# Patient Record
Sex: FEMALE | Race: OTHER | Employment: FULL TIME | URBAN - METROPOLITAN AREA
[De-identification: names, ages, dates, MRNs, and addresses within clinical notes are randomized per-mention and may not be internally consistent; named-entity substitution may affect disease eponyms.]

---

## 2018-04-11 ENCOUNTER — HOSPITAL ENCOUNTER (EMERGENCY)
Age: 48
Discharge: HOME | End: 2018-04-12
Attending: EMERGENCY MEDICINE
Payer: SELF-PAY

## 2018-04-11 ENCOUNTER — APPOINTMENT (OUTPATIENT)
Dept: CT IMAGING | Age: 48
End: 2018-04-11
Payer: SELF-PAY

## 2018-04-11 DIAGNOSIS — G44.209 ACUTE NON INTRACTABLE TENSION-TYPE HEADACHE: Primary | ICD-10-CM

## 2018-04-11 DIAGNOSIS — G47.00 INSOMNIA, UNSPECIFIED TYPE: ICD-10-CM

## 2018-04-11 PROCEDURE — 70450 CT HEAD/BRAIN W/O DYE: CPT

## 2018-04-11 PROCEDURE — 99282 EMERGENCY DEPT VISIT SF MDM: CPT | Performed by: EMERGENCY MEDICINE

## 2018-04-11 RX ORDER — MULTIVITAMIN WITH IRON
1 TABLET ORAL DAILY
COMMUNITY
End: 2022-04-05

## 2018-04-11 NOTE — LETTER
[facility] EMERGENCY DEPT 
[address] [place] [id number] [phone] Work/School Note Date: 4/11/2018 To Whom It May concern: 
 
Eleazar Amaro was seen and treated today in the emergency room by the following provider(s): 
Attending Provider: Danis Garces MD. Eleazar Amaro may return to work on 11/13/2018. Sincerely, Brent Davenport RN

## 2018-04-12 VITALS
DIASTOLIC BLOOD PRESSURE: 90 MMHG | WEIGHT: 145.44 LBS | SYSTOLIC BLOOD PRESSURE: 155 MMHG | TEMPERATURE: 98 F | HEART RATE: 75 BPM | BODY MASS INDEX: 28.55 KG/M2 | RESPIRATION RATE: 16 BRPM | HEIGHT: 60 IN | OXYGEN SATURATION: 100 %

## 2018-04-12 PROCEDURE — 74011250636 HC RX REV CODE- 250/636: Performed by: EMERGENCY MEDICINE

## 2018-04-12 PROCEDURE — 96375 TX/PRO/DX INJ NEW DRUG ADDON: CPT | Performed by: EMERGENCY MEDICINE

## 2018-04-12 PROCEDURE — 96361 HYDRATE IV INFUSION ADD-ON: CPT | Performed by: EMERGENCY MEDICINE

## 2018-04-12 PROCEDURE — 96374 THER/PROPH/DIAG INJ IV PUSH: CPT | Performed by: EMERGENCY MEDICINE

## 2018-04-12 RX ORDER — METOCLOPRAMIDE HYDROCHLORIDE 5 MG/ML
10 INJECTION INTRAMUSCULAR; INTRAVENOUS
Status: COMPLETED | OUTPATIENT
Start: 2018-04-12 | End: 2018-04-12

## 2018-04-12 RX ORDER — KETOROLAC TROMETHAMINE 30 MG/ML
30 INJECTION, SOLUTION INTRAMUSCULAR; INTRAVENOUS
Status: DISCONTINUED | OUTPATIENT
Start: 2018-04-12 | End: 2018-04-12

## 2018-04-12 RX ORDER — DIPHENHYDRAMINE HYDROCHLORIDE 50 MG/ML
12.5 INJECTION, SOLUTION INTRAMUSCULAR; INTRAVENOUS
Status: COMPLETED | OUTPATIENT
Start: 2018-04-12 | End: 2018-04-12

## 2018-04-12 RX ORDER — ZOLPIDEM TARTRATE 5 MG/1
5 TABLET ORAL
Qty: 12 TAB | Refills: 0 | Status: SHIPPED | OUTPATIENT
Start: 2018-04-12 | End: 2022-02-17

## 2018-04-12 RX ADMIN — SODIUM CHLORIDE 1000 ML: 900 INJECTION, SOLUTION INTRAVENOUS at 00:59

## 2018-04-12 RX ADMIN — METOCLOPRAMIDE 10 MG: 5 INJECTION, SOLUTION INTRAMUSCULAR; INTRAVENOUS at 00:55

## 2018-04-12 RX ADMIN — DIPHENHYDRAMINE HYDROCHLORIDE 12.5 MG: 50 INJECTION, SOLUTION INTRAMUSCULAR; INTRAVENOUS at 00:55

## 2018-04-12 NOTE — ED NOTES
I have reviewed discharge instructions with the patient. The patient verbalized understanding. Patient left ED via Discharge Method: ambulatory to Home with ( family, self). Opportunity for questions and clarification provided. Patient given 1 scripts. To continue your aftercare when you leave the hospital, you may receive an automated call from our care team to check in on how you are doing. This is a free service and part of our promise to provide the best care and service to meet your aftercare needs.  If you have questions, or wish to unsubscribe from this service please call [phone]. Thank you for Choosing our [employer] [facility] Emergency Department.

## 2018-04-12 NOTE — ED PROVIDER NOTES
HPI Comments: Per the family, the patient has chronic problems with sleep, often only getting 1-2 hours per night; lots of things on her mind. Patient is a 56 y.o. female presenting with headaches. The history is provided by the patient and a relative. The history is limited by a language barrier. A  was used. Headache    This is a new problem. The current episode started more than 1 week ago. The problem occurs constantly. The problem has been gradually worsening. The headache is aggravated by nothing. The pain is located in the generalized region. The quality of the pain is described as throbbing and dull. The pain is at a severity of 8/10. Associated symptoms include malaise/fatigue. Pertinent negatives include no anorexia, no fever, no chest pressure, no near-syncope, no orthopnea, no palpitations, no syncope, no shortness of breath, no weakness, no tingling, no dizziness, no visual change, no nausea and no vomiting. She has tried nothing for the symptoms. The treatment provided no relief. History reviewed. No pertinent past medical history. History reviewed. No pertinent surgical history. History reviewed. No pertinent family history. Social History     Social History    Marital status:      Spouse name: N/A    Number of children: N/A    Years of education: N/A     Occupational History    Not on file. Social History Main Topics    Smoking status: Never Smoker    Smokeless tobacco: Never Used    Alcohol use No    Drug use: No    Sexual activity: Not Currently     Other Topics Concern    Not on file     Social History Narrative    No narrative on file         ALLERGIES: Review of patient's allergies indicates no known allergies. Review of Systems   Constitutional: Positive for fatigue and malaise/fatigue. Negative for appetite change and fever. Respiratory: Negative for shortness of breath.     Cardiovascular: Negative for palpitations, orthopnea, syncope and near-syncope. Gastrointestinal: Negative for anorexia, nausea and vomiting. Neurological: Positive for headaches. Negative for dizziness, tingling, seizures, weakness, light-headedness and numbness. Psychiatric/Behavioral: Positive for decreased concentration and sleep disturbance. The patient is nervous/anxious. All other systems reviewed and are negative. Vitals:    04/11/18 2211   BP: (!) 180/95   Pulse: 78   Resp: 16   Temp: 98 °F (36.7 °C)   SpO2: 100%   Weight: 66 kg (145 lb 7 oz)   Height: 5' (1.524 m)            Physical Exam   Constitutional: She is oriented to person, place, and time. She appears well-developed and well-nourished. No distress. HENT:   Head: Normocephalic and atraumatic. Right Ear: Tympanic membrane and external ear normal.   Left Ear: Tympanic membrane and external ear normal.   Mouth/Throat: Oropharynx is clear and moist.   Eyes: Conjunctivae and EOM are normal. Pupils are equal, round, and reactive to light. Neck: Normal range of motion. Neck supple. No tracheal deviation present. Cardiovascular: Normal rate, regular rhythm, normal heart sounds and intact distal pulses. Exam reveals no gallop and no friction rub. No murmur heard. Pulmonary/Chest: Effort normal and breath sounds normal. No respiratory distress. She has no wheezes. Abdominal: Soft. Bowel sounds are normal. She exhibits no distension and no mass. There is no hepatosplenomegaly. There is no tenderness. There is no rebound and no guarding. Musculoskeletal: Normal range of motion. She exhibits no edema. Lymphadenopathy:     She has no cervical adenopathy. Neurological: She is alert and oriented to person, place, and time. She has normal strength. She displays normal reflexes. No cranial nerve deficit or sensory deficit. Coordination normal.   Skin: Skin is warm and dry. No rash noted. She is not diaphoretic. No erythema. Psychiatric: She has a normal mood and affect. Her speech is normal and behavior is normal. Judgment and thought content normal. Cognition and memory are normal.   Nursing note and vitals reviewed.        MDM  Number of Diagnoses or Management Options  Acute non intractable tension-type headache: new and requires workup  Insomnia, unspecified type: new and requires workup     Amount and/or Complexity of Data Reviewed  Tests in the radiology section of CPT®: ordered and reviewed  Review and summarize past medical records: yes    Risk of Complications, Morbidity, and/or Mortality  Presenting problems: moderate  Diagnostic procedures: moderate  Management options: moderate    Patient Progress  Patient progress: improved        ED Course       Procedures

## 2018-04-12 NOTE — DISCHARGE INSTRUCTIONS
Dolor de roly: Instrucciones de cuidado - [ Headache: Care Instructions ]  Instrucciones de cuidado    Los edson de roly tienen muchas causas posibles. La mayoría de los edson de roly no son señal de un problema más venice y mejoran por sí solos. El tratamiento en el hogar podría ayudarlo a sentirse mejor con [facility]. El médico lo singh revisado minuciosamente, roosevelt puede desarrollar problemas más tarde. Si nota algún problema o síntomas, busque tratamiento médico inmediatamente. La atención de seguimiento es melecio parte clave de brooks tratamiento y seguridad. Asegúrese de hacer y acudir a todas las citas, y llame a brooks médico si está teniendo problemas. También es melecio buena idea saber los resultados de los exámenes y mantener melecio lista de los medicamentos que angelita. ¿Cómo puede cuidarse en el hogar? · No conduzca si ha tomado analgésicos (medicamentos para el dolor) recetados. · Descanse en un cuarto tranquilo y oscuro hasta que desaparezca el dolor de Adam Mary Kate. Cierre los ojos y trate de relajarse o dormirse. No torie la televisión ni [place]. · Colóquese un paño frío y húmedo o [facility] compresa fría sobre la eneida adolorida de 10 a 21 minutos cada vez. Póngase un paño dhaliwal entre la compresa fría y la piel. · Utilice melecio toalla húmeda tibia o melecio almohadilla térmica ajustada a baja temperatura para relajar los músculos tensos del guilherme y los hombros.  · Pídale a alguien que le tess masajes suaves en el guilherme y los hombros.  · [place] los analgésicos exactamente mee le fueron indicados. ¨ Si el médico le recetó un analgésico, tómelo según las indicaciones. ¨ Si no está tomando un analgésico recetado, pregúntele a brokos médico si puede sarahy andrea de [employer]. · Tenga cuidado de no sarahy analgésicos con mayor frecuencia que la permitida en las indicaciones porque los edson de roly podrían empeorar o aparecer con mayor frecuencia melecio vez que el medicamento pierda brooks [place].   · Preste atención a los nuevos síntomas que aparecen con el dolor de [place], [place], debilidad o entumecimiento, cambios en la visión o confusión. Podrían ser señales de un problema más grave. Para prevenir los edson de roly  · [employer] un diario de juan carlos edson de roly para que pueda averiguar qué los desencadena. Evitar los desencadenantes podría ayudar a prevenir los edson de [place]. Anote cuándo empieza cada dolor de [place], cuánto dura y cómo es el dolor (palpitante, channing, punzante o sordo). Anote cualquier otro síntoma que haya tenido con el dolor de [place], [place] náuseas, destellos de cory o ARNIE, o sensibilidad a la cory brillante o a los ruidos jeri. Anote si el dolor de roly ocurrió cerca de brooks menstruación. Enumere todos los factores que pudieran nikki desencadenado el dolor de [place], mee ciertos alimentos (chocolate, queso, vino) u olores, humo, luces brillantes, estrés o falta de sueño. · Encuentre maneras saludables de [facility]. Los edson de [place] son más comunes delbert o paola después de un momento estresante. Tómese un tiempo para relajarse antes y después de hacer algo que le haya causado un dolor de roly en el pasado. · Trate de mantener juan carlos músculos relajados mediante melecio buena postura. Revise si tiene [employer] de la Annamarie, la carrie, el guilherme y los hombros y trate de relajarlos. Cuando se siente en un escritorio, cambie de posición con frecuencia y estírese por 27 segundos cada hora. · Danilo suficiente ejercicio y duerma bastante. · Coma en forma regular y jaycee. Largos períodos sin comer pueden provocar un dolor de roly. · Regálese un masaje. Algunas personas encuentran que los masajes hechos con regularidad son Elvi Muse para aliviar la tensión. · Limite la cafeína. No brandan demasiado café, té ni sodas. Marissa no deje de consumir cafeína de repente, porque eso también puede provocarle edson de [place].   · Reduzca la tensión en los ojos a causa de la computadora parpadeando con frecuencia y apartando la mirada de la pantalla a menudo. Asegúrese de tener lentes adecuados y de que brooks monitor esté colocado de manera correcta, mee a un brazo de distancia. · Busque ayuda si tiene depresión o ansiedad. Halima edson de [place] podrían relacionarse con estas afecciones. El tratamiento puede prevenir los edson de [place] y ayudar con los síntomas de ansiedad o depresión. ¿Cuándo debe pedir ayuda? Llame al 911 en cualquier momento que piense que puede necesitar atención de emergencia. Por ejemplo, llame si:  ? · Tiene señales de un ataque cerebral. Estas pueden incluir:  ¨ Parálisis, entumecimiento o debilidad repentinos en la carrie, el brazo o la pierna, sobre todo si ocurre en un solo lado del cuerpo. ¨ Cambios repentinos en la visión. ¨ Dificultades repentinas para hablar. ¨ Confusión repentina o dificultad para comprender frases sencillas. ¨ Problemas repentinos para caminar o mantener el equilibrio. ¨ Dolor de [place] intenso y repentino, distinto de los edson de roly anteriores. ?Llame a brooks médico ahora mismo o busque atención médica inmediata si:  ? · Tiene un dolor de roly nuevo o peor. ? · Brooks dolor de roly Cubresa. ¿Dónde puede encontrar más información en inglés? Vicky Shay a [email or web address]-anthony.info/. Escriba F023 en la búsqueda para aprender más acerca de \"Dolor de roly: Instrucciones de cuidado - [ Headache: Care Instructions ]. \"  Revisado: 14 octubre, 2016  Versión del contenido: 11.4  © 0175-2713 Healthwise, Incorporated. Las instrucciones de cuidado fueron adaptadas bajo licencia por [employer] (which disclaims liability or warranty for this information). Si usted tiene [place] afección médica o sobre estas instrucciones, siempre pregunte a brooks profesional de mary. Healthwise, Incorporated niega toda garantía o responsabilidad por brooks uso de esta información. Insomnio:  Instrucciones de cuidado - [ Insomnia: Care Instructions ]  Instrucciones de cuidado    El insomnio es la incapacidad para dormir jaycee. Es un problema común para la mayoría de las personas en algún momento. El insomnio puede hacer que resulte difícil dormirse, permanecer dormido o dormir el tiempo necesario. [place] puede provocarle fatiga y mal humor delbert el día. También puede hacer que esté olvidadizo e infeliz y que sea menos eficiente en [place]. Algunos trastornos, [employer] depresión o la ansiedad, pueden provocar insomnio. El dolor también puede afectar brooks capacidad para dormir. Cuando se resuelven [place] [facility], el insomnio generalmente desaparece. A veces, los malos hábitos de sueño pueden provocar insomnio. Si el insomnio le afecta en brooks trabajo o brooks capacidad para disfrutar la erasto, puede sarahy medidas para mejorar el sueño. La atención de seguimiento es melecio parte clave de brooks tratamiento y seguridad. Asegúrese de hacer y acudir a todas las citas, y llame a brooks médico si está teniendo problemas. También es melecio buena idea saber los resultados de los exámenes y mantener melecio lista de los medicamentos que angelita. ¿Cómo puede cuidarse en el hogar? Qué evitar  · No tome bebidas con cafeína, mee café o té dayana, delbert las 8 horas antes de [place]. · No fume ni use otros tipos de tabaco cerca de la hora de acostarse. La nicotina es un estimulante y puede mantenerlo despierto. · Evite beber alcohol por la noche, porque puede hacer que se despierte en la mitad de la noche. · No coma melecio comida abundante cerca de la hora de [place]. Si tiene hambre, coma algo liviano. · No tome mucha agua cerca de la hora de [place], porque la necesidad de orinar puede despertarlo delbert la noche. · No [place] ni torie televisión en la cama. Use la cama solo para dormir y para tener relaciones sexuales. Qué intentar  · Delfin Late a dormir a la misma hora todas las noches y levántese a la misma hora cada mañana.  No duerma la siesta delbert el día. · Mantenga brooks dormitorio silencioso, oscuro y fresco.  · Duerma con Rozena Scales y un colchón cómodos. · Si mirar el reloj le causa ansiedad, gírelo de [place] que no pueda tomas la hora. · Si tiene preocupaciones cuando se acuesta, lleve un diario de halima preocupaciones. Bastante antes de la hora de [place], escriba las preocupaciones y luego deje de lado el diario y halima inquietudes. · Pruebe la meditación u otras técnicas de relajación antes de [place]. · Si no puede dormir, levántese y Drusilla Allie a otra habitación hasta que sienta sueño. Danilo algo que lo relaje. Repita la rutina de acostarse antes de volver a la cama. · Danilo que brooks hogar esté en silencio y en calma aproximadamente melecio hora antes de acostarse. Baje la intensidad de las luces, apague el televisor y la computadora, y baje el volumen de la [place]. [place] puede ayudarle a relajarse después de un día ajetreado. ¿Cuándo debe pedir ayuda? Preste especial atención a los cambios en brooks mary y asegúrese de comunicarse con brooks médico si:  ? · Halima esfuerzos por mejorar el sueño no dan resultado. ? · Brooks insomnio empeora. ? · Se ha estado sintiendo decaído, deprimido o desesperanzado, o ha perdido el interés por cosas que disfrutaba hacer. ¿Dónde puede encontrar más información en inglés? Nany Jensen a http://joseph-anthony.info/. Escriba P513 en la búsqueda para aprender más acerca de \"Insomnio: Instrucciones de cuidado - [ Insomnia: Care Instructions ]. \"  Revisado: 26 julio, 2016  Versión del contenido: 11.4  © 5455-9366 Healthwise, Incorporated. Las instrucciones de cuidado fueron adaptadas bajo licencia por [employer] (which disclaims liability or warranty for this information). Si usted tiene [place] afección médica o sobre estas instrucciones, siempre pregunte a brooks profesional de mary. [employer] niega toda garantía o responsabilidad por brooks uso de esta información.

## 2018-04-12 NOTE — ED TRIAGE NOTES
States that she has had a headache for 1 week.   decreased sleep patterns and is having difficulty with her memory

## 2019-11-26 PROCEDURE — 99283 EMERGENCY DEPT VISIT LOW MDM: CPT

## 2019-11-27 ENCOUNTER — APPOINTMENT (OUTPATIENT)
Dept: CT IMAGING | Age: 49
End: 2019-11-27
Payer: SELF-PAY

## 2019-11-27 ENCOUNTER — HOSPITAL ENCOUNTER (EMERGENCY)
Age: 49
Discharge: HOME | End: 2019-11-27
Payer: SELF-PAY

## 2019-11-27 VITALS
BODY MASS INDEX: 28.47 KG/M2 | WEIGHT: 145 LBS | DIASTOLIC BLOOD PRESSURE: 82 MMHG | OXYGEN SATURATION: 98 % | HEART RATE: 70 BPM | RESPIRATION RATE: 16 BRPM | HEIGHT: 60 IN | TEMPERATURE: 97.6 F | SYSTOLIC BLOOD PRESSURE: 137 MMHG

## 2019-11-27 DIAGNOSIS — R51.9 LEFT FACIAL PAIN: Primary | ICD-10-CM

## 2019-11-27 LAB
ANION GAP SERPL CALC-SCNC: 2 MMOL/L (ref 7–16)
BASOPHILS # BLD: 0 K/UL (ref 0–0.2)
BASOPHILS NFR BLD: 0 % (ref 0–2)
BUN SERPL-MCNC: 14 MG/DL (ref 6–23)
CALCIUM SERPL-MCNC: 9.6 MG/DL (ref 8.3–10.4)
CHLORIDE SERPL-SCNC: 104 MMOL/L (ref 98–107)
CO2 SERPL-SCNC: 28 MMOL/L (ref 21–32)
CREAT SERPL-MCNC: 0.58 MG/DL (ref 0.6–1)
CRP SERPL-MCNC: <0.3 MG/DL (ref 0–0.9)
DIFFERENTIAL METHOD BLD: ABNORMAL
EOSINOPHIL # BLD: 0 K/UL (ref 0–0.8)
EOSINOPHIL NFR BLD: 0 % (ref 0.5–7.8)
ERYTHROCYTE [DISTWIDTH] IN BLOOD BY AUTOMATED COUNT: 14.4 % (ref 11.9–14.6)
ERYTHROCYTE [SEDIMENTATION RATE] IN BLOOD: 6 MM/HR (ref 0–20)
GLUCOSE SERPL-MCNC: 122 MG/DL (ref 65–100)
HCT VFR BLD AUTO: 42.7 % (ref 35.8–46.3)
HGB BLD-MCNC: 13.7 G/DL (ref 11.7–15.4)
IMM GRANULOCYTES # BLD AUTO: 0 K/UL (ref 0–0.5)
IMM GRANULOCYTES NFR BLD AUTO: 0 % (ref 0–5)
LYMPHOCYTES # BLD: 2.2 K/UL (ref 0.5–4.6)
LYMPHOCYTES NFR BLD: 22 % (ref 13–44)
MCH RBC QN AUTO: 27.2 PG (ref 26.1–32.9)
MCHC RBC AUTO-ENTMCNC: 32.1 G/DL (ref 31.4–35)
MCV RBC AUTO: 84.7 FL (ref 79.6–97.8)
MONOCYTES # BLD: 0.6 K/UL (ref 0.1–1.3)
MONOCYTES NFR BLD: 6 % (ref 4–12)
NEUTS SEG # BLD: 7.5 K/UL (ref 1.7–8.2)
NEUTS SEG NFR BLD: 72 % (ref 43–78)
NRBC # BLD: 0 K/UL (ref 0–0.2)
PLATELET # BLD AUTO: 244 K/UL (ref 150–450)
PMV BLD AUTO: 10.7 FL (ref 9.4–12.3)
POTASSIUM SERPL-SCNC: 4.1 MMOL/L (ref 3.5–5.1)
RBC # BLD AUTO: 5.04 M/UL (ref 4.05–5.2)
SODIUM SERPL-SCNC: 134 MMOL/L (ref 136–145)
WBC # BLD AUTO: 10.4 K/UL (ref 4.3–11.1)

## 2019-11-27 PROCEDURE — 85025 COMPLETE CBC W/AUTO DIFF WBC: CPT

## 2019-11-27 PROCEDURE — 70450 CT HEAD/BRAIN W/O DYE: CPT

## 2019-11-27 PROCEDURE — 85652 RBC SED RATE AUTOMATED: CPT

## 2019-11-27 PROCEDURE — 80048 BASIC METABOLIC PNL TOTAL CA: CPT

## 2019-11-27 PROCEDURE — 86140 C-REACTIVE PROTEIN: CPT

## 2019-11-27 RX ORDER — PREDNISONE 10 MG/1
TABLET ORAL
Qty: 21 TAB | Refills: 0 | Status: SHIPPED | OUTPATIENT
Start: 2019-11-27 | End: 2022-02-17

## 2019-11-27 NOTE — ED PROVIDER NOTES
72-year-old female complaining of left facial pain and swelling. Patient was seen by her primary care physician and diagnosed with shingles. Is taking acyclovir and prednisone at this time. Continues to have pain and swelling to the left side of her face she also has a headache. Patient was complaining of some blurred vision as well. Headache    This is a new problem. The current episode started more than 1 week ago. The problem occurs constantly. The problem has not changed since onset. The headache is aggravated by bright light. The pain is located in the left unilateral region. Past Medical History:   Diagnosis Date    Cancer [facility])     thyroid CA        No past surgical history on file. No family history on file.     Social History     Socioeconomic History    Marital status:      Spouse name: Not on file    Number of children: Not on file    Years of education: Not on file    Highest education level: Not on file   Occupational History    Not on file   Social Needs    Financial resource strain: Not on file    Food insecurity:     Worry: Not on file     Inability: Not on file    Transportation needs:     Medical: Not on file     Non-medical: Not on file   Tobacco Use    Smoking status: Never Smoker    Smokeless tobacco: Never Used   Substance and Sexual Activity    Alcohol use: No    Drug use: No    Sexual activity: Not Currently   Lifestyle    Physical activity:     Days per week: Not on file     Minutes per session: Not on file    Stress: Not on file   Relationships    Social connections:     Talks on phone: Not on file     Gets together: Not on file     Attends Pentecostal service: Not on file     Active member of club or organization: Not on file     Attends meetings of clubs or organizations: Not on file     Relationship status: Not on file    Intimate partner violence:     Fear of current or ex partner: Not on file     Emotionally abused: Not on file     Physically abused: Not on file     Forced sexual activity: Not on file   Other Topics Concern    Not on file   Social History Narrative    Not on file         ALLERGIES: Patient has no known allergies. Review of Systems   Constitutional: Negative. Negative for activity change. HENT: Negative. Eyes: Negative. Respiratory: Negative. Cardiovascular: Negative. Gastrointestinal: Negative. Genitourinary: Negative. Musculoskeletal: Negative. Skin: Negative. Neurological: Positive for headaches. Psychiatric/Behavioral: Negative. All other systems reviewed and are negative. Vitals:    11/26/19 2303   BP: 153/89   Pulse: 68   Resp: 18   Temp: 97.6 °F (36.4 °C)   SpO2: 98%   Weight: 65.8 kg (145 lb)   Height: 5' (1.524 m)            Physical Exam  Vitals signs and nursing note reviewed. Constitutional:       General: She is not in acute distress. Appearance: She is well-developed. She is not diaphoretic. HENT:      Head: Normocephalic and atraumatic. Right Ear: Ear canal and external ear normal. No hemotympanum. Tympanic membrane is not injected, scarred, perforated, erythematous, retracted or bulging. Left Ear: Ear canal and external ear normal. No hemotympanum. Tympanic membrane is not injected, scarred, perforated, erythematous, retracted or bulging. Nose: Nose normal.      Mouth/Throat:      Pharynx: No oropharyngeal exudate. Eyes:      General: No scleral icterus. Right eye: No discharge. Left eye: No discharge. Conjunctiva/sclera: Conjunctivae normal.      Pupils: Pupils are equal, round, and reactive to light. Neck:      Musculoskeletal: Normal range of motion and neck supple. Vascular: No JVD. Trachea: No tracheal deviation. Cardiovascular:      Rate and Rhythm: Normal rate and regular rhythm. Pulmonary:      Effort: Pulmonary effort is normal. No respiratory distress. Breath sounds: Normal breath sounds. No stridor.  No wheezing. Chest:      Chest wall: No tenderness. Abdominal:      General: Bowel sounds are normal. There is no distension. Palpations: Abdomen is soft. There is no mass. Tenderness: There is no tenderness. Musculoskeletal: Normal range of motion. General: No tenderness. Skin:     General: Skin is warm and dry. Coloration: Skin is not pale. Findings: No erythema or rash. Neurological:      Mental Status: She is alert and oriented to person, place, and time. Cranial Nerves: No cranial nerve deficit. Psychiatric:         Behavior: Behavior normal.         Thought Content: Thought content normal.          MDM  Number of Diagnoses or Management Options  Left facial pain: minor  Diagnosis management comments: No vesicular dendritic rash cannot see signs of shingles. Patient has normal inflammatory markers which would like to decrease the likelihood of giant cell temporal arteritis.        Amount and/or Complexity of Data Reviewed  Clinical lab tests: ordered and reviewed  Tests in the radiology section of CPT®: ordered and reviewed  Tests in the medicine section of CPT®: reviewed and ordered           Procedures

## 2019-11-27 NOTE — ED NOTES
Patient to ED via POV. Patient with complaint of pain of L sided facial pain x \"a few weeks. \" Seen by PMD, started on prednisone, acyclovir, trazodone. Patient reports worsening of symptoms with report of worsening L eye pain, photophobia. Patient with = , - facial droop, - arm sway. No s/s of acute CVA. Patient with hx thyroid CA, states in process of scheduling surgery.

## 2019-11-27 NOTE — ED NOTES
I have reviewed discharge instructions with the patient. The patient verbalized understanding. Patient left ED via Discharge Method: ambulatory to Home with her son. Opportunity for questions and clarification provided. Patient given 0 scripts. To continue your aftercare when you leave the hospital, you may receive an automated call from our care team to check in on how you are doing. This is a free service and part of our promise to provide the best care and service to meet your aftercare needs.  If you have questions, or wish to unsubscribe from this service please call [phone]. Thank you for Choosing our [facility] Emergency Department.

## 2019-11-27 NOTE — PROGRESS NOTES
available from 4:30 p.m. - 6:00 a.m. Please call Dougie at [phone] with any interpreting requests. Thank you,      Jess Garner 89  [email or web address] c: [phone] / [address] / [place], [address] [facility]  [email or web address]

## 2022-01-19 ENCOUNTER — HOSPITAL ENCOUNTER (EMERGENCY)
Age: 52
Discharge: HOME | End: 2022-01-20
Payer: COMMERCIAL

## 2022-01-19 ENCOUNTER — APPOINTMENT (OUTPATIENT)
Dept: CT IMAGING | Age: 52
End: 2022-01-19
Attending: PHYSICIAN ASSISTANT
Payer: COMMERCIAL

## 2022-01-19 VITALS
HEIGHT: 60 IN | WEIGHT: 158 LBS | SYSTOLIC BLOOD PRESSURE: 137 MMHG | BODY MASS INDEX: 31.02 KG/M2 | RESPIRATION RATE: 16 BRPM | DIASTOLIC BLOOD PRESSURE: 89 MMHG | HEART RATE: 72 BPM | OXYGEN SATURATION: 100 % | TEMPERATURE: 97.8 F

## 2022-01-19 DIAGNOSIS — N83.202 LEFT OVARIAN CYST: Primary | ICD-10-CM

## 2022-01-19 PROBLEM — N81.10 VAGINAL PROLAPSE: Status: ACTIVE | Noted: 2022-01-19

## 2022-01-19 PROBLEM — R10.84 GENERALIZED ABDOMINAL PAIN: Status: ACTIVE | Noted: 2022-01-19

## 2022-01-19 LAB
ALBUMIN SERPL-MCNC: 3.8 G/DL (ref 3.5–5)
ALBUMIN/GLOB SERPL: 0.9 {RATIO} (ref 1.2–3.5)
ALP SERPL-CCNC: 114 U/L (ref 50–136)
ALT SERPL-CCNC: 39 U/L (ref 12–65)
ANION GAP SERPL CALC-SCNC: 5 MMOL/L (ref 7–16)
AST SERPL-CCNC: 25 U/L (ref 15–37)
BASOPHILS # BLD: 0 K/UL (ref 0–0.2)
BASOPHILS NFR BLD: 0 % (ref 0–2)
BILIRUB SERPL-MCNC: 0.3 MG/DL (ref 0.2–1.1)
BUN SERPL-MCNC: 7 MG/DL (ref 6–23)
CALCIUM SERPL-MCNC: 9.5 MG/DL (ref 8.3–10.4)
CHLORIDE SERPL-SCNC: 105 MMOL/L (ref 98–107)
CO2 SERPL-SCNC: 29 MMOL/L (ref 21–32)
CREAT SERPL-MCNC: 0.58 MG/DL (ref 0.6–1)
DIFFERENTIAL METHOD BLD: NORMAL
EOSINOPHIL # BLD: 0.1 K/UL (ref 0–0.8)
EOSINOPHIL NFR BLD: 2 % (ref 0.5–7.8)
ERYTHROCYTE [DISTWIDTH] IN BLOOD BY AUTOMATED COUNT: 13.1 % (ref 11.9–14.6)
GLOBULIN SER CALC-MCNC: 4.3 G/DL (ref 2.3–3.5)
GLUCOSE SERPL-MCNC: 89 MG/DL (ref 65–100)
HCT VFR BLD AUTO: 43.3 % (ref 35.8–46.3)
HGB BLD-MCNC: 13.8 G/DL (ref 11.7–15.4)
IMM GRANULOCYTES # BLD AUTO: 0 K/UL (ref 0–0.5)
IMM GRANULOCYTES NFR BLD AUTO: 0 % (ref 0–5)
LYMPHOCYTES # BLD: 1.8 K/UL (ref 0.5–4.6)
LYMPHOCYTES NFR BLD: 28 % (ref 13–44)
MCH RBC QN AUTO: 27 PG (ref 26.1–32.9)
MCHC RBC AUTO-ENTMCNC: 31.9 G/DL (ref 31.4–35)
MCV RBC AUTO: 84.6 FL (ref 79.6–97.8)
MONOCYTES # BLD: 0.4 K/UL (ref 0.1–1.3)
MONOCYTES NFR BLD: 6 % (ref 4–12)
NEUTS SEG # BLD: 4 K/UL (ref 1.7–8.2)
NEUTS SEG NFR BLD: 64 % (ref 43–78)
NRBC # BLD: 0 K/UL (ref 0–0.2)
PLATELET # BLD AUTO: 235 K/UL (ref 150–450)
PMV BLD AUTO: 10.4 FL (ref 9.4–12.3)
POTASSIUM SERPL-SCNC: 3.7 MMOL/L (ref 3.5–5.1)
PROT SERPL-MCNC: 8.1 G/DL (ref 6.3–8.2)
RBC # BLD AUTO: 5.12 M/UL (ref 4.05–5.2)
SODIUM SERPL-SCNC: 139 MMOL/L (ref 136–145)
WBC # BLD AUTO: 6.3 K/UL (ref 4.3–11.1)

## 2022-01-19 PROCEDURE — 74177 CT ABD & PELVIS W/CONTRAST: CPT

## 2022-01-19 PROCEDURE — 96375 TX/PRO/DX INJ NEW DRUG ADDON: CPT

## 2022-01-19 PROCEDURE — 80053 COMPREHEN METABOLIC PANEL: CPT

## 2022-01-19 PROCEDURE — 96374 THER/PROPH/DIAG INJ IV PUSH: CPT

## 2022-01-19 PROCEDURE — 85025 COMPLETE CBC W/AUTO DIFF WBC: CPT

## 2022-01-19 PROCEDURE — 74011000636 HC RX REV CODE- 636

## 2022-01-19 PROCEDURE — 74011000258 HC RX REV CODE- 258

## 2022-01-19 PROCEDURE — 99283 EMERGENCY DEPT VISIT LOW MDM: CPT

## 2022-01-19 RX ORDER — SODIUM CHLORIDE 0.9 % (FLUSH) 0.9 %
10 SYRINGE (ML) INJECTION
Status: COMPLETED | OUTPATIENT
Start: 2022-01-19 | End: 2022-01-19

## 2022-01-19 RX ORDER — SODIUM CHLORIDE 0.9 % (FLUSH) 0.9 %
5-40 SYRINGE (ML) INJECTION AS NEEDED
Status: DISCONTINUED | OUTPATIENT
Start: 2022-01-19 | End: 2022-01-20 | Stop reason: HOSPADM

## 2022-01-19 RX ORDER — SODIUM CHLORIDE 0.9 % (FLUSH) 0.9 %
5-40 SYRINGE (ML) INJECTION EVERY 8 HOURS
Status: DISCONTINUED | OUTPATIENT
Start: 2022-01-19 | End: 2022-01-20 | Stop reason: HOSPADM

## 2022-01-19 RX ADMIN — Medication 10 ML: at 22:21

## 2022-01-19 RX ADMIN — IOPAMIDOL 100 ML: 755 INJECTION, SOLUTION INTRAVENOUS at 22:21

## 2022-01-19 RX ADMIN — SODIUM CHLORIDE 100 ML: 9 INJECTION, SOLUTION INTRAVENOUS at 22:21

## 2022-01-19 NOTE — ED NOTES
Pt c/o lower abd pain 4 days, no fever or vomiting possible ovarian cyst, no vaginal bleeding, no dysuria   Patient evaluated initially in triage. Rapid Medical Evaluation was conducted and necessary orders have been placed. I have performed a medical screening exam.  Care will now be transferred to the provider in the back of the emergency department.   NIKI Jaramillo 4:41 PM

## 2022-01-19 NOTE — ED TRIAGE NOTES
Patient with lower abdominal pain and pelvis pain, seen in office for vaginal prolapse. Patient was sent from outpatient procedure. Patient continues with severe pain and doctor wanted her seen in ED to rule out other possibilities.

## 2022-01-20 ENCOUNTER — APPOINTMENT (OUTPATIENT)
Dept: ULTRASOUND IMAGING | Age: 52
End: 2022-01-20
Payer: COMMERCIAL

## 2022-01-20 PROCEDURE — 74011250636 HC RX REV CODE- 250/636

## 2022-01-20 PROCEDURE — 93976 VASCULAR STUDY: CPT

## 2022-01-20 RX ORDER — NAPROXEN 500 MG/1
500 TABLET ORAL 2 TIMES DAILY WITH MEALS
Qty: 20 TABLET | Refills: 0 | Status: SHIPPED | OUTPATIENT
Start: 2022-01-20 | End: 2022-01-30

## 2022-01-20 RX ORDER — HYDROCODONE BITARTRATE AND ACETAMINOPHEN 7.5; 325 MG/1; MG/1
1 TABLET ORAL 2 TIMES DAILY
Qty: 8 TABLET | Refills: 0 | Status: SHIPPED | OUTPATIENT
Start: 2022-01-20 | End: 2022-02-19

## 2022-01-20 RX ORDER — ONDANSETRON 2 MG/ML
4 INJECTION INTRAMUSCULAR; INTRAVENOUS
Status: COMPLETED | OUTPATIENT
Start: 2022-01-20 | End: 2022-01-20

## 2022-01-20 RX ORDER — MORPHINE SULFATE 4 MG/ML
4 INJECTION INTRAVENOUS
Status: COMPLETED | OUTPATIENT
Start: 2022-01-20 | End: 2022-01-20

## 2022-01-20 RX ADMIN — MORPHINE SULFATE 4 MG: 4 INJECTION INTRAVENOUS at 03:04

## 2022-01-20 RX ADMIN — ONDANSETRON 4 MG: 2 INJECTION INTRAMUSCULAR; INTRAVENOUS at 03:04

## 2022-01-20 NOTE — ED PROVIDER NOTES
52-year-old female complaining of lower abdominal pain. Patient was seen at urologist office today for vaginal prolapse. However patient continued to complain of pain which is being on for 4 days. The urologist sent her to the ER for further evaluation of her abdominal pain. No fevers or chills no cough cold or flulike symptoms no diarrhea or constipation. The history is provided by the patient. Abdominal Pain   This is a new problem. The current episode started more than 2 days ago. The problem occurs constantly. The problem has not changed since onset. The pain is associated with an unknown factor. The pain is located in the suprapubic region. The quality of the pain is aching. The pain is at a severity of 8/10. The pain is moderate. Associated symptoms include dysuria. Pertinent negatives include no diarrhea, no nausea, no vomiting and no constipation. Nothing worsens the pain. The pain is relieved by nothing. Past Medical History:   Diagnosis Date    Cancer ([address]     thyroid CA        Past Surgical History:   Procedure Laterality Date    HX DILATION AND CURETTAGE      HX KNEE ARTHROSCOPY Left     HX ORTHOPAEDIC Left     Bone Transplant into wrist         No family history on file.     Social History     Socioeconomic History    Marital status:      Spouse name: Not on file    Number of children: Not on file    Years of education: Not on file    Highest education level: Not on file   Occupational History    Not on file   Tobacco Use    Smoking status: Never Smoker    Smokeless tobacco: Never Used   Substance and Sexual Activity    Alcohol use: No    Drug use: No    Sexual activity: Yes   Other Topics Concern    Not on file   Social History Narrative    Not on file     Social Determinants of Health     Financial Resource Strain:     Difficulty of Paying Living Expenses: Not on file   Food Insecurity:     Worried About Running Out of Food in the Last Year: Not on file    Ran Out of Food in the Last Year: Not on file   Transportation Needs:     Lack of Transportation (Medical): Not on file    Lack of Transportation (Non-Medical): Not on file   Physical Activity:     Days of Exercise per Week: Not on file    Minutes of Exercise per Session: Not on file   Stress:     Feeling of Stress : Not on file   Social Connections:     Frequency of Communication with Friends and Family: Not on file    Frequency of Social Gatherings with Friends and Family: Not on file    Attends Rastafari Services: Not on file    Active Member of [address] [place] [address] [employer] or Organizations: Not on file    Attends Club or Organization Meetings: Not on file    Marital Status: Not on file   Intimate Partner Violence:     Fear of Current or Ex-Partner: Not on file    Emotionally Abused: Not on file    Physically Abused: Not on file    Sexually Abused: Not on file   Housing Stability:     Unable to Pay for Housing in the Last Year: Not on file    Number of [place] in the Last Year: Not on file    Unstable Housing in the Last Year: Not on file         ALLERGIES: Patient has no known allergies. Review of Systems   Constitutional: Negative. Negative for activity change. HENT: Negative. Eyes: Negative. Respiratory: Negative. Cardiovascular: Negative. Gastrointestinal: Positive for abdominal pain. Negative for constipation, diarrhea, nausea and vomiting. Genitourinary: Positive for dysuria. Musculoskeletal: Negative. Skin: Negative. Neurological: Negative. Psychiatric/Behavioral: Negative. All other systems reviewed and are negative. Vitals:    01/19/22 1639 01/19/22 2037   BP: (!) 145/92 137/89   Pulse: 74 72   Resp: 16    Temp: 98 °F (36.7 °C) 97.8 °F (36.6 °C)   SpO2: 100% 100%   Weight: 71.7 kg (158 lb)    Height: 5' (1.524 m)             Physical Exam  Vitals and nursing note reviewed. Constitutional:       General: She is not in acute distress.      Appearance: She is well-developed. HENT:      Head: Normocephalic and atraumatic. Right Ear: External ear normal.      Left Ear: External ear normal.      Nose: Nose normal.   Eyes:      General: No scleral icterus. Right eye: No discharge. Left eye: No discharge. Conjunctiva/sclera: Conjunctivae normal.      Pupils: Pupils are equal, round, and reactive to light. Cardiovascular:      Rate and Rhythm: Regular rhythm. Pulmonary:      Effort: Pulmonary effort is normal. No respiratory distress. Breath sounds: Normal breath sounds. No stridor. No wheezing or rales. Abdominal:      General: Bowel sounds are normal. There is no distension. Palpations: Abdomen is soft. Tenderness: There is abdominal tenderness in the left lower quadrant. There is no guarding or rebound. Negative signs include Calvillo's sign, McBurney's sign and obturator sign. Musculoskeletal:         General: Normal range of motion. Cervical back: Normal range of motion. Skin:     General: Skin is warm and dry. Findings: No rash. Neurological:      Mental Status: She is alert and oriented to person, place, and time. Motor: No abnormal muscle tone. Coordination: Coordination normal.   Psychiatric:         Behavior: Behavior normal.          MDM  Number of Diagnoses or Management Options  Left ovarian cyst  Diagnosis management comments: Differential diagnosis: Biliary disease, appendicitis, pancreatitis, ovarian cyst, ovarian torsion, tubo-ovarian abscess, obstruction, ileus, aortic aneurysm, urolithiasis and renal colic. Abdominal pain has been greater than 4 days left lower quadrant spreading to the suprapubic area. Patient seen by urologist today has a diagnosis of prolapsed uterus. Will be scheduled for repair at some time in the future. Patient sent to rule out other causes of her pain. CT of the abdomen shows fibroid and left ovarian cyst 3 cm hemorrhagic.   Discussed with gynecology and will see her in the office pain control tonight.        Amount and/or Complexity of Data Reviewed  Clinical lab tests: ordered and reviewed  Tests in the radiology section of CPT®: ordered and reviewed  Tests in the medicine section of CPT®: ordered and reviewed  Decide to obtain previous medical records or to obtain history from someone other than the patient: yes  Review and summarize past medical records: yes  Discuss the patient with other providers: yes  Independent visualization of images, tracings, or specimens: yes    Risk of Complications, Morbidity, and/or Mortality  Presenting problems: high  Diagnostic procedures: high  Management options: high           Procedures

## 2022-01-20 NOTE — ED NOTES
I have reviewed discharge instructions with the patient. The patient verbalized understanding. Patient left ED via Discharge Method: ambulatory to Home with family member. Opportunity for questions and clarification provided. Patient given 2 scripts. To continue your aftercare when you leave the hospital, you may receive an automated call from our care team to check in on how you are doing. This is a free service and part of our promise to provide the best care and service to meet your aftercare needs.  If you have questions, or wish to unsubscribe from this service please call [phone]. Thank you for Choosing our [facility] Emergency Department.

## 2022-02-11 PROBLEM — N94.19 DYSPAREUNIA DUE TO MEDICAL CONDITION IN FEMALE: Status: ACTIVE | Noted: 2022-02-11

## 2022-02-11 PROBLEM — N83.209 CYST OF OVARY: Status: ACTIVE | Noted: 2022-02-11

## 2022-02-11 PROBLEM — R32 URINARY INCONTINENCE: Status: ACTIVE | Noted: 2022-02-11

## 2022-02-11 PROBLEM — D21.9 FIBROID: Status: ACTIVE | Noted: 2022-02-11

## 2022-02-11 PROBLEM — N85.2 ENLARGED UTERUS: Status: ACTIVE | Noted: 2022-02-11

## 2022-02-15 ENCOUNTER — HOSPITAL ENCOUNTER (OUTPATIENT)
Dept: SURGERY | Age: 52
Discharge: HOME | End: 2022-02-15

## 2022-02-17 VITALS — BODY MASS INDEX: 26.49 KG/M2 | WEIGHT: 159 LBS | HEIGHT: 65 IN

## 2022-02-17 NOTE — PERIOP NOTES
Patient verified name and  using T-VIPS  [id number]. Order for consent not found in EHR. Type 2 surgery, PAT phone assessment complete. Orders not received. Labs per surgeon: no orders received. Labs per anesthesia protocol: Hgb- Pt instructed to come for lab work (Monday- Friday 8:00 AM- 3:30 PM) prior to surgery day. Pt voiced an understanding. Patient COVID test date 22; Patient did show for the appointment. The testing center is located at the [facility] [address]  [place]. If appointment is needed-patient provided telephone number of [phone]. Patient answered medical/surgical history questions at their best of ability. All prior to admission medications documented in [facility]. Patient instructed to take the following medications the day of surgery according to anesthesia guidelines with a small sip of water: gabapentin and Lortab if needed. Hold all vitamins 7 days prior to surgery and NSAIDS 5 days prior to surgery. Prescription meds to hold: Ibuprofen. Patient instructed on the following:    > Arrive at A Entrance, time of arrival to be called the day before by 1700  > Nothing to eat after Midnight but can have clear liquids up until 2 hours prior to arrival time  > Responsible adult must drive patient to the hospital, stay during surgery, and patient will need supervision 24 hours after anesthesia  > Use Hibiclens  in shower the night before surgery and on the morning of surgery  > All piercings must be removed prior to arrival.    > Leave all valuables (money and jewelry) at home but bring insurance card and ID on DOS.   > You may be required to pay a deductible or co-pay on the day of your procedure. You can pre-pay by calling [phone] if your surgery is at the [facility] or [phone] if your surgery is at the [facility].   > Do not wear make-up, nail polish, lotions, cologne, perfumes, powders, or oil on skin. Artificial nails are not permitted. o9    Prydeinig surgical instructions, Hibiclens wash with Chinese instructions and ERAS pre-surgery drinks with Chinese instructions left for pt to  on 2/18/22.

## 2022-02-18 ENCOUNTER — HOSPITAL ENCOUNTER (OUTPATIENT)
Dept: LAB | Age: 52
Discharge: HOME | End: 2022-02-18
Attending: OBSTETRICS & GYNECOLOGY
Payer: COMMERCIAL

## 2022-02-18 LAB — HGB BLD-MCNC: 13.3 G/DL (ref 11.7–15.4)

## 2022-02-18 PROCEDURE — 36415 COLL VENOUS BLD VENIPUNCTURE: CPT

## 2022-02-18 PROCEDURE — 86304 IMMUNOASSAY TUMOR CA 125: CPT

## 2022-02-18 PROCEDURE — 85018 HEMOGLOBIN: CPT

## 2022-02-18 NOTE — H&P
Gynecology History and Physical    Name: Ortega Jernigan MRN: [id number] SSN: [id number]    YOB: 1970  Age: 49 y.o. Sex: female       Subjective:      Chief complaint:  severe pain w/ enlarged fibroid uterus, ovarian cysts. HPI:    Saul Pandey  is a 49 y.o. , , NEW PT   who is seen for ER FU for c/o severe pain w/ enlarged fibroid uterus, ovarian cysts. [place] Islander speaking. Jesse Luis our  in our office was present for the entire encounter.       Contraception:  IUD (? Name) --states placed about 8yrs ago at outside facility and was told good for 10yrs (likely Paragard).       No peirods at all x 2yrs   No hot flashes        Started having pain mostly isolated to sex 2 months ago. Progressed to daily occurrence and significantly increased in severity causing her to present to the ER on 22 after initially being seen by Dr Love Gentile (Laina Gross) for c/o prolapse, pain, and incontinence. does have to splint to complete urination, a BM, or for comfort     Incontinence:  See Dr Jagdeep Rodriguez notes  Velma Duenas been leaking urine for 2-3 weeks. She leaks urine both during the day and at night. She does wake up wet. She does leak urine with cough, laugh, sneeze and activity. She does leak urine with urgency. Most of her leaking is with movement. She wears hand-towels in her panties and changes 3-4 in 34 hours. She leaks 3-4 times per day. She wakes up wet up to 4 times per week- she will have to change her bed sheets. When she leaks she leaks both small and large amounts. She has not  tried PT, she has not  tried medication. She has not  had procedures/surgery for this in the past.\"        known hx of fibroids in the past but have never caused issues.             Imaging:  ER TVUS 22:  Clinical history: Left lower quadrant pain.  Possible ovarian cyst.     TECHNIQUE: Transabdominal pelvic ultrasound     COMPARISON: CT from yesterday.     FINDINGS:     Uterus measures 8.1 x 7.1 x 5.7 cm, although not well visualized. There is a 5.2  x 4.8 x 4.9 cm heterogeneous mass in the posterior uterus, appearing  intramural/subserosal. This is likely a fibroid. Endometrial echo complex  measures 4 mm.     There is a 3.5 cm cyst in the left ovary. Left ovary measures 5.6 x 3.2 x 3.2  cm. There is color Doppler flow.      Right ovary is unremarkable and measures 3.9 x 2.6 x 2.0 cm. There is color  Doppler flow.     No free fluid.        IMPRESSION     1. An approximately 3.5 cm left ovarian cyst. No evidence of ovarian torsion.     2. Uterine fibroid.      3. IUD within the uterus.      4. Only transabdominal ultrasound examination was performed. Overall sensitivity  limited, without the benefit of transvaginal ultrasound study.              CT 1/19/22:  CT abdomen and pelvis with contrast      Clinical history: Lower abdominal pain. History of vaginal prolapse. .     Comparison: None      FINDINGS:     Visualized lung bases are unremarkable.        Abdomen findings:     There is no radiopaque gallstone. The liver, pancreas, spleen, adrenal glands,  and abdominal aorta are unremarkable.     Tiny nonobstructing right renal calculus. There is small left renal cyst. There  is no hydronephrosis. No evidence of lymphadenopathy.     Stomach is normal in contour. Small bowel loops are normal in caliber. No small  bowel obstruction.        Pelvic findings:     There is heterogeneous appearance of the uterus. Small uterine fibroids are  suggested. There is an intrauterine device. There is a 3 cm low density lesion  in the left adnexa, likely ovarian cystic lesion. Urinary bladder is normal in  contour.     The colon is normal in course and caliber. Appendix is normal.     There is no free air or free fluid. Surrounding bones are intact.     IMPRESSION     1. A 3 cm cystic lesion in the left adnexa, suspicious for hemorrhagic ovarian  cyst.     2. Heterogeneous appearance of the uterus with suggestion of uterine fibroids.   Intrauterine device is present in the uterus.     3. Negative for appendicitis, colitis, diverticulitis or bowel obstruction.     3. Tiny nonobstructing right renal calculus. No hydronephrosis.                Repeat  TVUS in office today:  UTerus 164mL  EM 3.8mm  ENLARGED UT WITH IUD SEEN WNL IN THE FUNDAL ENDOMETRIUM.  2 FIBROIDS SEEN. LARGEST IS POSTERIOR (QUESTIONABLY SUBSEROSAL  VS INTRAMURAL). 6.1 X 3.4 X 5.0CM. ENDOMTRIUM= 3.8MM. RT OV- WNL. LT OV- 2 COMPLEX CYSTS: 1) SMALLEST IS POSTERIOR AND COLLAPSED. 2) LARGEST MEASURES 3.7 X 2.1 X 3.1CM WITH A QUESTIONABLE MURAL NODULE TO THE LEFT WITHIN THE CYST, MEASURING 1.2 X 0.9 X 1.2CM. THERE IS 2.3MM SEPTATION BETWEEN THE CYSTS. BLOOD FLOW IN THE PERIPHERY OF TH OV.  NO PELVIC FF.           GYN HISTORY:  As per HPI     Last Pap: 2021 per pt ---somewhat unsure about this however; records not available to me   Hx Abnl Paps: never      Mammo:  2021      Last colonoscopy; Never           OB hx:  Hx D&C  x1      1 SAB  4        OB History        5    Para   4    Term   4            AB   1    Living   4       SAB   1    IAB        Ectopic        Molar        Multiple        Live Births              Obstetric Comments   1 SAB  4            Past Medical History:   Diagnosis Date    Bell's palsy     Cancer ([facility] [address]     thyroid CA - pt states she has not had any treatment, Only documentation found in EHR is for thyroid nodule     Fibroid, uterine     Hyperlipidemia     Ovarian cyst      Past Surgical History:   Procedure Laterality Date    HX DILATION AND CURETTAGE      HX KNEE ARTHROSCOPY Left     HX ORTHOPAEDIC Left     Bone Transplant into wrist     Social History     Occupational History    Not on file   Tobacco Use    Smoking status: Never Smoker    Smokeless tobacco: Never Used   Vaping Use    Vaping Use: Never used   Substance and Sexual Activity    Alcohol use: No    Drug use: No    Sexual activity: Yes     No family history on file. No Known Allergies  Prior to Admission medications    Medication Sig Start Date End Date Taking? Authorizing Provider   ibuprofen (MOTRIN) 800 mg tablet Take 1 Tablet by mouth every six (6) hours as needed for Pain. 2/10/22   Enrique Ashraf MD   gabapentin (NEURONTIN) 300 mg capsule Take 1 Capsule by mouth three (3) times daily. 2/10/22   Enrique Ashraf MD   HYDROcodone-acetaminophen (Norco) 7.5-325 mg per tablet Take 1 Tablet by mouth two (2) times a day for 30 days. Max Daily Amount: 2 Tablets. 1/20/22 2/19/22  Vikas Chu MD   ezetimibe-simvastatin (VYTORIN) 10-40 mg per tablet ezetimibe 10 mg-simvastatin 40 mg tablet   Take 1 tablet every day by oral route at bedtime for 30 days. Provider, Delio   multivitamin with iron (DAILY MULTI-VITAMINS/IRON) tablet Take 1 Tab by mouth daily. Other, MD Rogelio        Review of Systems:  A comprehensive review of systems was negative except for that written in the History of Present Illness. Objective:     PHYSICAL EXAM:  Visit Vitals  /80   Wt 159 lb 3.2 oz (72.2 kg)   BMI 31.09 kg/m²       Constitutional: She appears well-developed and well-nourished. No distress but does appear uncomfortable tdoay. HENT:    Head: Normocephalic and atraumatic. Cardiovascular: Regular pulse   Pulmonary/Chest: Effort normal  Skin: She is not diaphoretic. Psychiatric: She has a normal mood and affect. Her behavior is normal. Thought content normal. .     Pelvic deferred           Counseling  Discussed the risks of surgery including the risks of bleeding, infection, deep vein thrombosis, and surgical injuries to internal organs including but not limited to the bowels, bladder, rectum, and female reproductive organs. The patient understands the risks; any and all questions were answered to the patient's satisfaction.     After re-reviewing images and discussing with Media Cover Dr Isma Dodson, there is a quoted approximately 20% chance of an ovarian cancer present with the mural nodule as noted on US on the left ovary. Given these findings, would recommend changing surgery planned to a JANNETTE, BSO to reduce the risk of cancer spill in the event that a cancer were to be present. We will also get a preOp  to help assess risk.     Would recommend if this is elevated to have her procedure performed by GYN ONC if she is able to wait (based on pain symptoms) so as to decrease the risk of needing a second surgery for staging.       All of this was interpreted in Georgian in depth over 40 minutes through the [facility]        Assessment/Plan:     49 y.o., , with large fibroid uterus, severe pelvic pain:     -Posted for JANNETTE, BSO due to severe pelvic pain w/ large fibroid uterus, Left ovarian cyst   -preOp          Jennifer Torres MD

## 2022-02-19 LAB — CANCER AG125 SERPL-ACNC: 7 U/ML (ref 1.5–35)

## 2022-02-20 ENCOUNTER — ANESTHESIA EVENT (OUTPATIENT)
Dept: SURGERY | Age: 52
End: 2022-02-20
Payer: COMMERCIAL

## 2022-02-21 ENCOUNTER — ANESTHESIA (OUTPATIENT)
Dept: SURGERY | Age: 52
End: 2022-02-21
Payer: COMMERCIAL

## 2022-02-21 ENCOUNTER — HOSPITAL ENCOUNTER (OUTPATIENT)
Age: 52
Setting detail: OBSERVATION
Discharge: HOME | End: 2022-02-23
Attending: OBSTETRICS & GYNECOLOGY | Admitting: OBSTETRICS & GYNECOLOGY
Payer: COMMERCIAL

## 2022-02-21 DIAGNOSIS — R10.84 GENERALIZED ABDOMINAL PAIN: ICD-10-CM

## 2022-02-21 DIAGNOSIS — D21.9 FIBROID: ICD-10-CM

## 2022-02-21 DIAGNOSIS — N85.2 ENLARGED UTERUS: ICD-10-CM

## 2022-02-21 DIAGNOSIS — N83.202 CYST OF LEFT OVARY: ICD-10-CM

## 2022-02-21 DIAGNOSIS — N83.299 COMPLEX OVARIAN CYST: ICD-10-CM

## 2022-02-21 LAB
ABO + RH BLD: NORMAL
BLOOD GROUP ANTIBODIES SERPL: NORMAL
HCG UR QL: NEGATIVE
SPECIMEN EXP DATE BLD: NORMAL

## 2022-02-21 PROCEDURE — 88307 TISSUE EXAM BY PATHOLOGIST: CPT

## 2022-02-21 PROCEDURE — 74011250637 HC RX REV CODE- 250/637: Performed by: OBSTETRICS & GYNECOLOGY

## 2022-02-21 PROCEDURE — 74011000250 HC RX REV CODE- 250

## 2022-02-21 PROCEDURE — 74011250636 HC RX REV CODE- 250/636

## 2022-02-21 PROCEDURE — 86900 BLOOD TYPING SEROLOGIC ABO: CPT

## 2022-02-21 PROCEDURE — 77030031139 HC SUT VCRL2 J&J -A: Performed by: OBSTETRICS & GYNECOLOGY

## 2022-02-21 PROCEDURE — 76210000006 HC OR PH I REC 0.5 TO 1 HR: Performed by: OBSTETRICS & GYNECOLOGY

## 2022-02-21 PROCEDURE — 77030018836 HC SOL IRR NACL ICUM -A: Performed by: OBSTETRICS & GYNECOLOGY

## 2022-02-21 PROCEDURE — 77030003029 HC SUT VCRL J&J -B: Performed by: OBSTETRICS & GYNECOLOGY

## 2022-02-21 PROCEDURE — 88112 CYTOPATH CELL ENHANCE TECH: CPT

## 2022-02-21 PROCEDURE — 77030040830 HC CATH URETH FOL MDII -A: Performed by: OBSTETRICS & GYNECOLOGY

## 2022-02-21 PROCEDURE — 74011250636 HC RX REV CODE- 250/636: Performed by: OBSTETRICS & GYNECOLOGY

## 2022-02-21 PROCEDURE — 76010000162 HC OR TIME 1.5 TO 2 HR INTENSV-TIER 1: Performed by: OBSTETRICS & GYNECOLOGY

## 2022-02-21 PROCEDURE — 74011250636 HC RX REV CODE- 250/636: Performed by: ANESTHESIOLOGY

## 2022-02-21 PROCEDURE — 58150 TOTAL HYSTERECTOMY: CPT | Performed by: OBSTETRICS & GYNECOLOGY

## 2022-02-21 PROCEDURE — 74011250637 HC RX REV CODE- 250/637: Performed by: ANESTHESIOLOGY

## 2022-02-21 PROCEDURE — 76060000034 HC ANESTHESIA 1.5 TO 2 HR: Performed by: OBSTETRICS & GYNECOLOGY

## 2022-02-21 PROCEDURE — 77030019908 HC STETH ESOPH SIMS -A: Performed by: ANESTHESIOLOGY

## 2022-02-21 PROCEDURE — 77030002966 HC SUT PDS J&J -A: Performed by: OBSTETRICS & GYNECOLOGY

## 2022-02-21 PROCEDURE — 81025 URINE PREGNANCY TEST: CPT

## 2022-02-21 PROCEDURE — 77030037088 HC TUBE ENDOTRACH ORAL NSL COVD-A: Performed by: ANESTHESIOLOGY

## 2022-02-21 PROCEDURE — 2709999900 HC NON-CHARGEABLE SUPPLY: Performed by: OBSTETRICS & GYNECOLOGY

## 2022-02-21 PROCEDURE — 77030039425 HC BLD LARYNG TRULITE DISP TELE -A: Performed by: ANESTHESIOLOGY

## 2022-02-21 PROCEDURE — 77030040922 HC BLNKT HYPOTHRM STRY -A: Performed by: ANESTHESIOLOGY

## 2022-02-21 PROCEDURE — 77030003602 HC NDL NRV BLK BBMI -B: Performed by: ANESTHESIOLOGY

## 2022-02-21 PROCEDURE — 77030040361 HC SLV COMPR DVT MDII -B: Performed by: OBSTETRICS & GYNECOLOGY

## 2022-02-21 PROCEDURE — 77030002974 HC SUT PLN J&J -A: Performed by: OBSTETRICS & GYNECOLOGY

## 2022-02-21 PROCEDURE — 77030011266 HC ELECTRD BLD INSL COVD -A: Performed by: OBSTETRICS & GYNECOLOGY

## 2022-02-21 PROCEDURE — 77030010507 HC ADH SKN DERMBND J&J -B: Performed by: OBSTETRICS & GYNECOLOGY

## 2022-02-21 RX ORDER — CELECOXIB 100 MG/1
100 CAPSULE ORAL 2 TIMES DAILY
Status: DISCONTINUED | OUTPATIENT
Start: 2022-02-22 | End: 2022-02-22

## 2022-02-21 RX ORDER — CEFAZOLIN SODIUM/WATER 2 G/20 ML
2 SYRINGE (ML) INTRAVENOUS ONCE
Status: COMPLETED | OUTPATIENT
Start: 2022-02-21 | End: 2022-02-21

## 2022-02-21 RX ORDER — SODIUM CHLORIDE, SODIUM LACTATE, POTASSIUM CHLORIDE, CALCIUM CHLORIDE 600; 310; 30; 20 MG/100ML; MG/100ML; MG/100ML; MG/100ML
40 INJECTION, SOLUTION INTRAVENOUS CONTINUOUS
Status: DISPENSED | OUTPATIENT
Start: 2022-02-21 | End: 2022-02-22

## 2022-02-21 RX ORDER — EPHEDRINE SULFATE/0.9% NACL/PF 50 MG/5 ML
SYRINGE (ML) INTRAVENOUS AS NEEDED
Status: DISCONTINUED | OUTPATIENT
Start: 2022-02-21 | End: 2022-02-21 | Stop reason: HOSPADM

## 2022-02-21 RX ORDER — ACETAMINOPHEN 500 MG
1000 TABLET ORAL ONCE
Status: COMPLETED | OUTPATIENT
Start: 2022-02-21 | End: 2022-02-21

## 2022-02-21 RX ORDER — APREPITANT 40 MG/1
40 CAPSULE ORAL ONCE
Status: COMPLETED | OUTPATIENT
Start: 2022-02-21 | End: 2022-02-21

## 2022-02-21 RX ORDER — ACETAMINOPHEN 500 MG
1000 TABLET ORAL EVERY 6 HOURS
Status: DISCONTINUED | OUTPATIENT
Start: 2022-02-21 | End: 2022-02-23 | Stop reason: HOSPADM

## 2022-02-21 RX ORDER — POLYETHYLENE GLYCOL 3350 17 G/17G
17 POWDER, FOR SOLUTION ORAL DAILY
Status: DISCONTINUED | OUTPATIENT
Start: 2022-02-22 | End: 2022-02-23 | Stop reason: HOSPADM

## 2022-02-21 RX ORDER — SCOLOPAMINE TRANSDERMAL SYSTEM 1 MG/1
1 PATCH, EXTENDED RELEASE TRANSDERMAL ONCE
Status: DISCONTINUED | OUTPATIENT
Start: 2022-02-21 | End: 2022-02-23 | Stop reason: HOSPADM

## 2022-02-21 RX ORDER — ONDANSETRON 2 MG/ML
INJECTION INTRAMUSCULAR; INTRAVENOUS AS NEEDED
Status: DISCONTINUED | OUTPATIENT
Start: 2022-02-21 | End: 2022-02-21 | Stop reason: HOSPADM

## 2022-02-21 RX ORDER — OXYCODONE HYDROCHLORIDE 5 MG/1
5-10 TABLET ORAL
Status: DISCONTINUED | OUTPATIENT
Start: 2022-02-21 | End: 2022-02-23 | Stop reason: HOSPADM

## 2022-02-21 RX ORDER — ONDANSETRON 4 MG/1
8 TABLET, ORALLY DISINTEGRATING ORAL
Status: DISCONTINUED | OUTPATIENT
Start: 2022-02-21 | End: 2022-02-23 | Stop reason: HOSPADM

## 2022-02-21 RX ORDER — ALBUTEROL SULFATE 0.83 MG/ML
2.5 SOLUTION RESPIRATORY (INHALATION) AS NEEDED
Status: DISCONTINUED | OUTPATIENT
Start: 2022-02-21 | End: 2022-02-21

## 2022-02-21 RX ORDER — NALOXONE HYDROCHLORIDE 0.4 MG/ML
0.4 INJECTION, SOLUTION INTRAMUSCULAR; INTRAVENOUS; SUBCUTANEOUS AS NEEDED
Status: DISCONTINUED | OUTPATIENT
Start: 2022-02-21 | End: 2022-02-23 | Stop reason: HOSPADM

## 2022-02-21 RX ORDER — LIDOCAINE HYDROCHLORIDE 10 MG/ML
0.1 INJECTION INFILTRATION; PERINEURAL AS NEEDED
Status: DISCONTINUED | OUTPATIENT
Start: 2022-02-21 | End: 2022-02-21 | Stop reason: HOSPADM

## 2022-02-21 RX ORDER — DEXAMETHASONE SODIUM PHOSPHATE 4 MG/ML
INJECTION, SOLUTION INTRA-ARTICULAR; INTRALESIONAL; INTRAMUSCULAR; INTRAVENOUS; SOFT TISSUE AS NEEDED
Status: DISCONTINUED | OUTPATIENT
Start: 2022-02-21 | End: 2022-02-21 | Stop reason: HOSPADM

## 2022-02-21 RX ORDER — ONDANSETRON 2 MG/ML
4 INJECTION INTRAMUSCULAR; INTRAVENOUS
Status: DISCONTINUED | OUTPATIENT
Start: 2022-02-21 | End: 2022-02-21

## 2022-02-21 RX ORDER — PROPOFOL 10 MG/ML
INJECTION, EMULSION INTRAVENOUS AS NEEDED
Status: DISCONTINUED | OUTPATIENT
Start: 2022-02-21 | End: 2022-02-21 | Stop reason: HOSPADM

## 2022-02-21 RX ORDER — FENTANYL CITRATE 50 UG/ML
INJECTION, SOLUTION INTRAMUSCULAR; INTRAVENOUS AS NEEDED
Status: DISCONTINUED | OUTPATIENT
Start: 2022-02-21 | End: 2022-02-21 | Stop reason: HOSPADM

## 2022-02-21 RX ORDER — ROPIVACAINE HYDROCHLORIDE 5 MG/ML
INJECTION, SOLUTION EPIDURAL; INFILTRATION; PERINEURAL
Status: DISCONTINUED | OUTPATIENT
Start: 2022-02-21 | End: 2022-02-21 | Stop reason: HOSPADM

## 2022-02-21 RX ORDER — OXYCODONE HYDROCHLORIDE 5 MG/1
5 TABLET ORAL
Status: DISCONTINUED | OUTPATIENT
Start: 2022-02-21 | End: 2022-02-21

## 2022-02-21 RX ORDER — HYDROMORPHONE HYDROCHLORIDE 2 MG/ML
0.5 INJECTION, SOLUTION INTRAMUSCULAR; INTRAVENOUS; SUBCUTANEOUS
Status: DISCONTINUED | OUTPATIENT
Start: 2022-02-21 | End: 2022-02-21

## 2022-02-21 RX ORDER — LIDOCAINE HYDROCHLORIDE 20 MG/ML
INJECTION, SOLUTION EPIDURAL; INFILTRATION; INTRACAUDAL; PERINEURAL AS NEEDED
Status: DISCONTINUED | OUTPATIENT
Start: 2022-02-21 | End: 2022-02-21 | Stop reason: HOSPADM

## 2022-02-21 RX ORDER — MIDAZOLAM HYDROCHLORIDE 1 MG/ML
2 INJECTION, SOLUTION INTRAMUSCULAR; INTRAVENOUS
Status: COMPLETED | OUTPATIENT
Start: 2022-02-21 | End: 2022-02-21

## 2022-02-21 RX ORDER — NEOSTIGMINE METHYLSULFATE 1 MG/ML
INJECTION, SOLUTION INTRAVENOUS AS NEEDED
Status: DISCONTINUED | OUTPATIENT
Start: 2022-02-21 | End: 2022-02-21 | Stop reason: HOSPADM

## 2022-02-21 RX ORDER — KETAMINE HYDROCHLORIDE 50 MG/ML
INJECTION, SOLUTION INTRAMUSCULAR; INTRAVENOUS AS NEEDED
Status: DISCONTINUED | OUTPATIENT
Start: 2022-02-21 | End: 2022-02-21 | Stop reason: HOSPADM

## 2022-02-21 RX ORDER — SODIUM CHLORIDE, SODIUM LACTATE, POTASSIUM CHLORIDE, CALCIUM CHLORIDE 600; 310; 30; 20 MG/100ML; MG/100ML; MG/100ML; MG/100ML
25 INJECTION, SOLUTION INTRAVENOUS CONTINUOUS
Status: DISCONTINUED | OUTPATIENT
Start: 2022-02-21 | End: 2022-02-21 | Stop reason: HOSPADM

## 2022-02-21 RX ORDER — GABAPENTIN 300 MG/1
300 CAPSULE ORAL 3 TIMES DAILY
Status: DISCONTINUED | OUTPATIENT
Start: 2022-02-21 | End: 2022-02-23 | Stop reason: HOSPADM

## 2022-02-21 RX ORDER — ROCURONIUM BROMIDE 10 MG/ML
INJECTION, SOLUTION INTRAVENOUS AS NEEDED
Status: DISCONTINUED | OUTPATIENT
Start: 2022-02-21 | End: 2022-02-21 | Stop reason: HOSPADM

## 2022-02-21 RX ORDER — FACIAL-BODY WIPES
10 EACH TOPICAL DAILY PRN
Status: DISCONTINUED | OUTPATIENT
Start: 2022-02-21 | End: 2022-02-23 | Stop reason: HOSPADM

## 2022-02-21 RX ORDER — KETOROLAC TROMETHAMINE 30 MG/ML
30 INJECTION, SOLUTION INTRAMUSCULAR; INTRAVENOUS EVERY 6 HOURS
Status: COMPLETED | OUTPATIENT
Start: 2022-02-21 | End: 2022-02-22

## 2022-02-21 RX ORDER — GLYCOPYRROLATE 0.2 MG/ML
INJECTION INTRAMUSCULAR; INTRAVENOUS AS NEEDED
Status: DISCONTINUED | OUTPATIENT
Start: 2022-02-21 | End: 2022-02-21 | Stop reason: HOSPADM

## 2022-02-21 RX ADMIN — ROCURONIUM BROMIDE 5 MG: 50 INJECTION, SOLUTION INTRAVENOUS at 12:36

## 2022-02-21 RX ADMIN — ROCURONIUM BROMIDE 10 MG: 50 INJECTION, SOLUTION INTRAVENOUS at 12:16

## 2022-02-21 RX ADMIN — ROPIVACAINE HYDROCHLORIDE 15 ML: 5 INJECTION, SOLUTION EPIDURAL; INFILTRATION; PERINEURAL at 13:25

## 2022-02-21 RX ADMIN — ROCURONIUM BROMIDE 30 MG: 50 INJECTION, SOLUTION INTRAVENOUS at 11:52

## 2022-02-21 RX ADMIN — SODIUM CHLORIDE, SODIUM LACTATE, POTASSIUM CHLORIDE, AND CALCIUM CHLORIDE 40 ML/HR: 600; 310; 30; 20 INJECTION, SOLUTION INTRAVENOUS at 15:11

## 2022-02-21 RX ADMIN — ACETAMINOPHEN 1000 MG: 500 TABLET, FILM COATED ORAL at 10:09

## 2022-02-21 RX ADMIN — SODIUM CHLORIDE, SODIUM LACTATE, POTASSIUM CHLORIDE, AND CALCIUM CHLORIDE: 600; 310; 30; 20 INJECTION, SOLUTION INTRAVENOUS at 13:21

## 2022-02-21 RX ADMIN — Medication 10 MG: at 12:23

## 2022-02-21 RX ADMIN — HYDROMORPHONE HYDROCHLORIDE 0.5 MG: 2 INJECTION, SOLUTION INTRAMUSCULAR; INTRAVENOUS; SUBCUTANEOUS at 13:46

## 2022-02-21 RX ADMIN — ACETAMINOPHEN 1000 MG: 500 TABLET, FILM COATED ORAL at 16:28

## 2022-02-21 RX ADMIN — KETAMINE HYDROCHLORIDE 30 MG: 50 INJECTION, SOLUTION INTRAMUSCULAR; INTRAVENOUS at 11:52

## 2022-02-21 RX ADMIN — ONDANSETRON 4 MG: 2 INJECTION INTRAMUSCULAR; INTRAVENOUS at 12:47

## 2022-02-21 RX ADMIN — PROPOFOL 180 MG: 10 INJECTION, EMULSION INTRAVENOUS at 11:52

## 2022-02-21 RX ADMIN — SODIUM CHLORIDE, SODIUM LACTATE, POTASSIUM CHLORIDE, AND CALCIUM CHLORIDE 25 ML/HR: 600; 310; 30; 20 INJECTION, SOLUTION INTRAVENOUS at 10:16

## 2022-02-21 RX ADMIN — Medication 5 MG: at 11:52

## 2022-02-21 RX ADMIN — GLYCOPYRROLATE 0.4 MG: 0.2 INJECTION, SOLUTION INTRAMUSCULAR; INTRAVENOUS at 13:26

## 2022-02-21 RX ADMIN — FENTANYL CITRATE 50 MCG: 50 INJECTION INTRAMUSCULAR; INTRAVENOUS at 12:09

## 2022-02-21 RX ADMIN — GABAPENTIN 300 MG: 300 CAPSULE ORAL at 15:10

## 2022-02-21 RX ADMIN — GABAPENTIN 300 MG: 300 CAPSULE ORAL at 22:00

## 2022-02-21 RX ADMIN — LIDOCAINE HYDROCHLORIDE 100 MG: 20 INJECTION, SOLUTION EPIDURAL; INFILTRATION; INTRACAUDAL; PERINEURAL at 11:52

## 2022-02-21 RX ADMIN — APREPITANT 40 MG: 40 CAPSULE ORAL at 10:09

## 2022-02-21 RX ADMIN — DEXAMETHASONE SODIUM PHOSPHATE 10 MG: 4 INJECTION, SOLUTION INTRAMUSCULAR; INTRAVENOUS at 12:00

## 2022-02-21 RX ADMIN — ROPIVACAINE HYDROCHLORIDE 15 ML: 5 INJECTION, SOLUTION EPIDURAL; INFILTRATION; PERINEURAL at 13:23

## 2022-02-21 RX ADMIN — Medication 2 G: at 11:50

## 2022-02-21 RX ADMIN — Medication 3 MG: at 13:26

## 2022-02-21 RX ADMIN — KETOROLAC TROMETHAMINE 30 MG: 30 INJECTION, SOLUTION INTRAMUSCULAR at 15:12

## 2022-02-21 RX ADMIN — OXYCODONE 10 MG: 5 TABLET ORAL at 22:00

## 2022-02-21 RX ADMIN — MIDAZOLAM 2 MG: 1 INJECTION INTRAMUSCULAR; INTRAVENOUS at 11:28

## 2022-02-21 RX ADMIN — Medication 5 MG: at 12:41

## 2022-02-21 RX ADMIN — OXYCODONE 10 MG: 5 TABLET ORAL at 15:10

## 2022-02-21 RX ADMIN — FENTANYL CITRATE 50 MCG: 50 INJECTION INTRAMUSCULAR; INTRAVENOUS at 11:47

## 2022-02-21 NOTE — PERIOP NOTES
Patient son [place] here with her, cell # [phone]  Consents and preop information obtained via interpreting services

## 2022-02-21 NOTE — PERIOP NOTES
TRANSFER - OUT REPORT:    Verbal report given to CUONG Jones on George Car  being transferred to Room 331 for routine progression of care       Report consisted of patients Situation, Background, Assessment and   Recommendations(SBAR). Information from the following report(s) SBAR, Procedure Summary, Intake/Output, MAR, Recent Results and Cardiac Rhythm SR was reviewed with the receiving nurse. Lines:   Peripheral IV 02/21/22 Posterior;Right Wrist (Active)   Site Assessment Clean, dry, & intact 02/21/22 1415   Phlebitis Assessment 0 02/21/22 1415   Infiltration Assessment 0 02/21/22 1415   Dressing Status Clean, dry, & intact 02/21/22 1415   Dressing Type Tape;Transparent 02/21/22 1415   Hub Color/Line Status Green; Infusing;Patent 02/21/22 1415        Opportunity for questions and clarification was provided.       Patient transported with:   room air, miky

## 2022-02-21 NOTE — ANESTHESIA POSTPROCEDURE EVALUATION
Procedure(s): HYSTERECTOMY ABDOMINAL TOTAL WITH BILATERAL SALPINGO OOPHORECTOMY . general    Anesthesia Post Evaluation      Multimodal analgesia: multimodal analgesia used between 6 hours prior to anesthesia start to PACU discharge  Patient location during evaluation: PACU  Patient participation: complete - patient participated  Level of consciousness: responsive to physical stimuli and responsive to verbal stimuli (asleep)  Airway patency: patent  Anesthetic complications: no  Cardiovascular status: acceptable  Respiratory status: acceptable  Hydration status: acceptable  Post anesthesia nausea and vomiting:  none      INITIAL Post-op Vital signs:   Vitals Value Taken Time   /79 02/21/22 1400   Temp 36.9 °C (98.4 °F) 02/21/22 1339   Pulse 65 02/21/22 1408   Resp 16 02/21/22 1400   SpO2 97 % 02/21/22 1408   Vitals shown include unvalidated device data.

## 2022-02-21 NOTE — ANESTHESIA PREPROCEDURE EVALUATION
Relevant Problems   No relevant active problems       Anesthetic History   No history of anesthetic complications            Review of Systems / Medical History  Patient summary reviewed and pertinent labs reviewed    Pulmonary  Within defined limits                 Neuro/Psych   Within defined limits           Cardiovascular              Hyperlipidemia    Exercise tolerance: >4 METS     GI/Hepatic/Renal  Within defined limits              Endo/Other  Within defined limits           Other Findings              Physical Exam    Airway  Mallampati: II  TM Distance: 4 - 6 cm         Cardiovascular    Rhythm: regular  Rate: normal      Pertinent negatives: No murmur   Dental         Pulmonary  Breath sounds clear to auscultation               Abdominal         Other Findings            Anesthetic Plan    ASA: 2  Anesthesia type: general      Post-op pain plan if not by surgeon: peripheral nerve block single    Induction: Intravenous  Anesthetic plan and risks discussed with: Patient      GETA - abd blocks planned

## 2022-02-21 NOTE — OP NOTES
OPERATIVE NOTE      Patient ID:   Name: Ortega Jernigan    Medical Record Number: [id number]    YOB: 1970      DATE OF PROCEDURE:  2022    PREOPERATIVE DIAGNOSIS:  48 yo  w/ enlarged fibroid uterus, severe pelvic pain, Left ovarian cyst with Mural Nodule        POSTOPERATIVE DIAGNOSIS:  JACK     PROCEDURE:  Total Abdominal Hysterectomy, bilateral Salpingo-Oophorectomy     SURGEON:  Denis Lawler MD    ASSISTANT:  Didi Restrepo. ANESTHESIA:  General endotracheal anesthesia    SPECIMENS:  Uterus, bilateral tubes, ovaries, cervix     EBL: 300 cc    FINDINGS: enlarged fibroid uterus, left ovarian cyst measuring approx 3 cm; normal appearing bilateral tubes. No ascites. COMPLICATIONS:  None       DESCRIPTION OF PROCEDURE:   The patient was placed on the operating room table in the supine position and placed under general endotracheal anesthesia. The patient was prepped and draped in the usual fashion for abdominal surgery. Time out was done to confirm the operating procedure, surgeon, patient and site. A Pfannenstiel skin incision was made with the scalpel down to the fascia. The fascia was knicked in the midline. The incision was extended laterally using owen scissors. The cephalad fascia was grasped with kochers and the rectus muscles  off both bluntly and sharply. The process was repeated on the inferior fascia. Rectus muscles were  bluntly. The peritoneum was entered using Metzenbaum scissors and the incision was extended laterally bluntly. The uterus was palpated and elevated up out of the abdomen. The OSekou-OMorton retractor was placed in the abdomen. The bowel was packed out of the field with three lap squares. The uterine fundus was grasped with a double-tooth tenaculum. The round ligaments bilaterally were doubly clamped with curved Radha clamps, incised, and suture ligated with 0 Vicryl.  The anterior leaf of the broad ligament was then incised down on both sides and across the lower uterine segment. The bladder flap was bluntly and sharply developed. An avascular window of the posterior leaf of the broad ligament was then penetrated. Curved R-N clamps were used to grasp the IP ligaments bilaterally. These pedicles were back clamped, incised, and doubly ligated first with a free tie of 0  Vicryl followed by a fore-and-aft stitch of 0 Vicryl. The uterine vessels were next skeletonized, bilaterally clamped with curved R-N clamps, and incised. Then each was doubly ligated with 2 single stick ties of 0 Vicryl. Pedicles were then developed down to the cardinal ligaments on each side, each pedicle being clamped with a straight R-N clamp, incised with the scalpel, and Guicho suture ligated with 0 Vicryl. Upon reaching the vaginal cuff, the cuff was cross-clamped with curved R-N clamps, incised with Julia scissors, and the uterus, tubes, and ovaries were removed from the operative field. The vaginal angles were sewn with Guicho stitches of 0 Vicryl on each side. Then, the remainder of the cuff was closed with figure-of-eight stitches of 0 Vicryl. Hemostasis was ensured. The pelvis was irrigated and suctioned clean. The lap squares were removed as well as the retractor and all other instruments. Counts were correct. The Fascia was closed w/ PDS. 2-0 plain was used to approximate the subcutaneous tissues in a running fashion. Vicryl was used to close the skin. The patient tolerated the procedure well. Sponge, lap, and needle counts were correct and the patient was taken to recovery in stable condition.       Signed By: Tomas May MD     February 21, 2022

## 2022-02-21 NOTE — PERIOP NOTES
Dr. Selene Freeman at bedside; updates given; patient resting comfortably with eyes closed. He did place bilateral TAP blocks at end of case.

## 2022-02-21 NOTE — PROGRESS NOTES
Admission assessment complete. Pt resting in bed, call light within reach, side rails up x 3, and bed low and locked. Pt oriented to room and menu. Lower abdominal incision intact with no bleeding.  in room for interpretation. No needs at this time.

## 2022-02-21 NOTE — ANESTHESIA PROCEDURE NOTES
Right TAP    Start time: 2/21/2022 1:24 PM  End time: 2/21/2022 1:25 PM  Performed by: Mayra Cortez MD  Authorized by: Mayra Cortez MD       Pre-procedure:    Indications: at surgeon's request and post-op pain management    Preanesthetic Checklist: patient identified, risks and benefits discussed, site marked, timeout performed, anesthesia consent given and patient being monitored    Timeout Time: 13:19 EST          Block Type:   Block Type:  TAP  Laterality:  Right  Monitoring:  Standard ASA monitoring  Injection Technique:  Single shot  Patient Position: supine  Prep: chlorhexidine    Location:  Abdominal  Needle Gauge:  20 G  Needle Localization:  Ultrasound guidance  Medication Injected:  Ropivacaine (PF) (NAROPIN)(0.5%) 5 mg/mL injection, 15 mL (diluted with NS 15ml)  Med Admin Time: 2/21/2022 1:25 PM    Assessment:  Number of attempts:  1  Injection Assessment:  Incremental injection every 5 mL, local visualized surrounding nerve on ultrasound, negative aspiration for blood, no intravascular symptoms and ultrasound image on chart  Patient tolerance:  Patient tolerated the procedure well with no immediate complications

## 2022-02-21 NOTE — ANESTHESIA PROCEDURE NOTES
Left TAP    Start time: 2/21/2022 1:21 PM  End time: 2/21/2022 1:23 PM  Performed by: Velma Bryant MD  Authorized by: Velma Bryant MD       Pre-procedure:    Indications: at surgeon's request and post-op pain management    Preanesthetic Checklist: patient identified, risks and benefits discussed, site marked, timeout performed, anesthesia consent given and patient being monitored    Timeout Time: 13:19 EST          Block Type:   Block Type:  TAP  Laterality:  Left  Monitoring:  Standard ASA monitoring  Injection Technique:  Single shot  Procedures: ultrasound guided    Patient Position: supine  Prep: chlorhexidine    Location:  Abdominal  Needle Gauge:  20 G  Needle Localization:  Ultrasound guidance  Medication Injected:  Ropivacaine (PF) (NAROPIN)(0.5%) 5 mg/mL injection, 15 mL (diluted with NS 15ml)  Med Admin Time: 2/21/2022 1:23 PM    Assessment:  Number of attempts:  1  Injection Assessment:  Incremental injection every 5 mL, local visualized surrounding nerve on ultrasound, negative aspiration for blood, no intravascular symptoms and ultrasound image on chart  Patient tolerance:  Patient tolerated the procedure well with no immediate complications

## 2022-02-22 LAB
HCT VFR BLD AUTO: 37.2 % (ref 35.8–46.3)
HGB BLD-MCNC: 11.9 G/DL (ref 11.7–15.4)

## 2022-02-22 PROCEDURE — 36415 COLL VENOUS BLD VENIPUNCTURE: CPT

## 2022-02-22 PROCEDURE — 85018 HEMOGLOBIN: CPT

## 2022-02-22 PROCEDURE — 74011250637 HC RX REV CODE- 250/637: Performed by: OBSTETRICS & GYNECOLOGY

## 2022-02-22 PROCEDURE — 74011250636 HC RX REV CODE- 250/636: Performed by: OBSTETRICS & GYNECOLOGY

## 2022-02-22 RX ORDER — IBUPROFEN 800 MG/1
800 TABLET ORAL
Status: DISCONTINUED | OUTPATIENT
Start: 2022-02-22 | End: 2022-02-23 | Stop reason: HOSPADM

## 2022-02-22 RX ADMIN — OXYCODONE 10 MG: 5 TABLET ORAL at 09:47

## 2022-02-22 RX ADMIN — OXYCODONE 10 MG: 5 TABLET ORAL at 17:15

## 2022-02-22 RX ADMIN — OXYCODONE 5 MG: 5 TABLET ORAL at 02:10

## 2022-02-22 RX ADMIN — ACETAMINOPHEN 1000 MG: 500 TABLET, FILM COATED ORAL at 02:10

## 2022-02-22 RX ADMIN — KETOROLAC TROMETHAMINE 30 MG: 30 INJECTION, SOLUTION INTRAMUSCULAR at 06:24

## 2022-02-22 RX ADMIN — ACETAMINOPHEN 1000 MG: 500 TABLET, FILM COATED ORAL at 11:27

## 2022-02-22 RX ADMIN — POLYETHYLENE GLYCOL 3350 17 G: 17 POWDER, FOR SOLUTION ORAL at 09:47

## 2022-02-22 RX ADMIN — GABAPENTIN 300 MG: 300 CAPSULE ORAL at 09:46

## 2022-02-22 RX ADMIN — CELECOXIB 100 MG: 100 CAPSULE ORAL at 09:46

## 2022-02-22 RX ADMIN — KETOROLAC TROMETHAMINE 30 MG: 30 INJECTION, SOLUTION INTRAMUSCULAR at 11:27

## 2022-02-22 RX ADMIN — CELECOXIB 100 MG: 100 CAPSULE ORAL at 17:15

## 2022-02-22 RX ADMIN — IBUPROFEN 800 MG: 800 TABLET, FILM COATED ORAL at 15:24

## 2022-02-22 RX ADMIN — GABAPENTIN 300 MG: 300 CAPSULE ORAL at 15:11

## 2022-02-22 RX ADMIN — ACETAMINOPHEN 1000 MG: 500 TABLET, FILM COATED ORAL at 17:15

## 2022-02-22 RX ADMIN — OXYCODONE 10 MG: 5 TABLET ORAL at 13:22

## 2022-02-22 RX ADMIN — ACETAMINOPHEN 1000 MG: 500 TABLET, FILM COATED ORAL at 06:24

## 2022-02-22 RX ADMIN — KETOROLAC TROMETHAMINE 30 MG: 30 INJECTION, SOLUTION INTRAMUSCULAR at 02:10

## 2022-02-22 NOTE — PROGRESS NOTES
Unsuccessful attempt to contact the patient as no one answered the phone this time. I will try again another time.           Thank you,         Julia Joe [facility] [employer] [facility] Department  [facility]  [phone] (phone)

## 2022-02-22 NOTE — PROGRESS NOTES
Gynecology Progress Note      POD#1     Patient doing well and without significant complaints. Pain controlled on current medication. Bolaños catheter is still in place and pt has not yet attempted ambulation. Patient states she is passing flatus and tolerating regular diet. No n/v.      Pt is inquiring about possible DC later today. No UOP charted overnight. 700cc clear urine in bolaños bag currently. Most likely was simply not documented overnight but this will be cleared up w/ prior RN. Vitals:    Visit Vitals  BP (!) 97/59   Pulse 73   Temp 98.5 °F (36.9 °C)   Resp 16   Ht 5' (1.524 m)   Wt 157 lb 12.8 oz (71.6 kg)   SpO2 95%   BMI 30.82 kg/m²         Date 22 07 - 22 0659 22 07 - 22 0659   Shift 8662-0820 3822-1365 24 Hour Total 2272-8877 6374-9170 24 Hour Total   INTAKE   I.V.(mL/kg/hr) 1400(1.6)  1400(0.8)        Volume (lactated Ringers infusion) 1400  1400      Shift Total(mL/kg) 1400(19.6)  1400(19.6)      OUTPUT   Urine(mL/kg/hr) 475(0.6)  475(0.3) 700  700     Urine Output 200  200        Urine Output (mL) ([REMOVED] Urinary Catheter 22 2- way; Bola[place]) 275  275 700  700   Blood 300  300        Estimated Blood Loss 300  300      Shift Total(mL/kg) 775(10.8)  775(10.8) 700(9.8)  700(9.8)     625 -700  -700   Weight (kg) 71.6 71.6 71.6 71.6 71.6 71.6         Physical Exam:  Constitutional: She appears well-developed and well-nourished. No distress. HENT:    Head: Normocephalic and atraumatic. Lungs: CTAB, effort normal  Cardiovascular: RRR, no M/R/G  Abd:  S/appropriately TTP/ND, BS normoactive. Incision   c/d/i w/out erythema/induration   Skin: She is not diaphoretic. Psychiatric: She has a normal mood and affect.  Her behavior is normal. Thought content normal.     Lab/Data Review:  CBC:   Lab Results   Component Value Date/Time    HGB 11.9 2022 08:31 AM    HCT 37.2 2022 08:31 AM          Assessment and Plan:    55yo  POD#1 s/p JANNETTE, BSO due to enlarged fibroid uterus, severe pelvic pain, Left ovarian cyst with Mural Nodule:    Patient appears to be having uncomplicated post   course. Continue routine post-op care. DC bolaños, ambulate.     If able to void and still meeting all goals pt will be appropriate for DC home later today         Juany Mccabe MD

## 2022-02-22 NOTE — PROGRESS NOTES
Shift assessment complete. Pt resting in bed, call light within reach, side rails up x 3, and bed low and locked. Abdominal incision intact with no bleeding.  in room for interpretation. No needs at this time.

## 2022-02-23 VITALS
HEIGHT: 60 IN | HEART RATE: 58 BPM | WEIGHT: 157.8 LBS | TEMPERATURE: 98.2 F | OXYGEN SATURATION: 97 % | SYSTOLIC BLOOD PRESSURE: 109 MMHG | RESPIRATION RATE: 16 BRPM | DIASTOLIC BLOOD PRESSURE: 60 MMHG | BODY MASS INDEX: 30.98 KG/M2

## 2022-02-23 PROBLEM — Z90.710 H/O: HYSTERECTOMY: Status: ACTIVE | Noted: 2022-02-23

## 2022-02-23 PROCEDURE — G0378 HOSPITAL OBSERVATION PER HR: HCPCS

## 2022-02-23 PROCEDURE — 74011250637 HC RX REV CODE- 250/637: Performed by: OBSTETRICS & GYNECOLOGY

## 2022-02-23 RX ORDER — OXYCODONE HYDROCHLORIDE 5 MG/1
5-10 TABLET ORAL
Qty: 30 TABLET | Refills: 0 | Status: SHIPPED | OUTPATIENT
Start: 2022-02-23 | End: 2022-03-09

## 2022-02-23 RX ORDER — GABAPENTIN 300 MG/1
300 CAPSULE ORAL 3 TIMES DAILY
Qty: 90 CAPSULE | Refills: 0 | Status: SHIPPED | OUTPATIENT
Start: 2022-02-23 | End: 2022-04-05

## 2022-02-23 RX ORDER — ACETAMINOPHEN 500 MG
1000 TABLET ORAL
Qty: 90 TABLET | Refills: 0 | Status: SHIPPED | OUTPATIENT
Start: 2022-02-23

## 2022-02-23 RX ADMIN — OXYCODONE 5 MG: 5 TABLET ORAL at 08:00

## 2022-02-23 RX ADMIN — ACETAMINOPHEN 1000 MG: 500 TABLET, FILM COATED ORAL at 06:18

## 2022-02-23 RX ADMIN — GABAPENTIN 300 MG: 300 CAPSULE ORAL at 08:00

## 2022-02-23 RX ADMIN — POLYETHYLENE GLYCOL 3350 17 G: 17 POWDER, FOR SOLUTION ORAL at 09:00

## 2022-02-23 RX ADMIN — IBUPROFEN 800 MG: 800 TABLET, FILM COATED ORAL at 08:00

## 2022-02-23 RX ADMIN — ACETAMINOPHEN 1000 MG: 500 TABLET, FILM COATED ORAL at 00:31

## 2022-02-23 RX ADMIN — ACETAMINOPHEN 1000 MG: 500 TABLET, FILM COATED ORAL at 12:15

## 2022-02-23 RX ADMIN — OXYCODONE 5 MG: 5 TABLET ORAL at 10:25

## 2022-02-23 RX ADMIN — OXYCODONE 10 MG: 5 TABLET ORAL at 14:08

## 2022-02-23 RX ADMIN — IBUPROFEN 800 MG: 800 TABLET, FILM COATED ORAL at 14:08

## 2022-02-23 NOTE — PROGRESS NOTES
Gynecology Progress Note      POD#2    Patient doing well and without significant complaints. Reports pain was more difficult to control later in the day yesterday so wanted to stay overnight. Better controlled today on current medication. Voiding without difficulty. Patient is passing flatus. Tolerating regular diet. No n/v. No VB. No complaints today. Vitals:    Visit Vitals  /60   Pulse (!) 58   Temp 98.2 °F (36.8 °C)   Resp 16   Ht 5' (1.524 m)   Wt 157 lb 12.8 oz (71.6 kg)   SpO2 97%   BMI 30.82 kg/m²         Date 02/ 07 - / 0659 22 07 - 22 0659   Shift 6116-6113 0162-3711 24 Hour Total 9508-3856 1896-8274 24 Hour Total   INTAKE   Shift Total(mL/kg)         OUTPUT   Urine(mL/kg/hr) 1700(2)  1700(1) 1500  1500     Urine Voided 1000  1000 1500  1500     Urine Output (mL) ([REMOVED] Urinary Catheter 22 2- way; [place]) 700  700      Shift Total(mL/kg) 1700(23.8)  1700(23.8) 1500(21)  1500(21)   NET -1700  -1700 -1500  -1500   Weight (kg) 71.6 71.6 71.6 71.6 71.6 71.6         Physical Exam:  Constitutional: She appears well-developed and well-nourished. No distress. HENT:    Head: Normocephalic and atraumatic. Lungs: CTAB, effort normal  Cardiovascular: RRR, no M/R/G  Abd:  S/appropriately TTP/ND, BS normoactive. Incision c/d/i w/out erythema/induration   Skin: She is not diaphoretic. Psychiatric: She has a normal mood and affect. Her behavior is normal. Thought content normal.     Lab/Data Review:  CBC: No results found for: WBC, HGB, HGBEXT, HCT, HCTEXT, PLT, PLTEXT, HGBEXT, HCTEXT, PLTEXT       Assessment and Plan:      52yo  POD#2 s/p JANNETTE, BSO due to enlarged fibroid uterus, severe pelvic pain, Left ovarian cyst with Mural Nodule:     Patient appears to be having uncomplicated post course.     Appropriate for DC home today   FU in 2wks in office       Tea Arvizu MD

## 2022-02-23 NOTE — DISCHARGE INSTRUCTIONS
Patient Education        Histerectomía abdominal: Aysevictoria Trujillo en el hogar  Abdominal Hysterectomy: What to Expect at Home  Brooks recuperación     Puede esperar sentirse mejor y más darrell cada día, aunque es posible que necesite analgésicos (medicamentos para el dolor) delbert melecio o Archie. Podría fatigarse con facilidad o tener menos energía de lo habitual. Theresa puede durar varias semanas después de la cirugía. Es probable que note que brooks abdomen está hinchado. Theresa es común. La hinchazón tardará varias semanas en desaparecer. Podría tardar aproximadamente 4 a 6 semanas en recuperarse por completo. Es importante que evite levantar objetos delbert la recuperación para que pueda sanar. Esta hoja de [employer] idea general del tiempo que le llevará recuperarse. Sin embargo, cada persona se recupera a un ritmo diferente. Siga los pasos que se mencionan a continuación para recuperarse lo más rápido posible. ¿Cómo puede cuidarse en el hogar? Actividad    · Descanse cuando se sienta fatigada. Dormir lo suficiente la ayudará a recuperarse.     · Intente caminar todos los días. Comience caminando un poco más de lo que caminó el día anterior. Poco a poco, aumente la distancia. Caminar aumenta el flujo de Precious y [place] a prevenir la neumonía y el estreñimiento.     · Evite levantar objetos que pudieran implicar un esfuerzo. Theresa podría incluir un christa, bolsas de las compras y envases de [place] pesados, mochilas o maletines pesados, bolsas de arena para excremento de stephanie o alimentos para perros, o melecio aspiradora.     · Evite actividades intensas, mee [employer], trotar, levantar pesas o hacer ejercicios aeróbicos, hasta que brooks médico lo apruebe.     · Puede ducharse. Seque el obdulia (incisión) con toques suaves de toalla.  No tome abe de inmersión delbert las primeras 2 semanas o hasta que brooks médico lo apruebe.     · Pregúntele a brooks médico cuándo puede volver a conducir.     · Es probable que necesite ausentarse del [place] de 2 a 4 semanas. [place] dependerá del tipo de trabajo que tess y cómo se sienta.     · Brooks médico le indicará cuándo puede volver a tener relaciones sexuales. Alimentación    · Puede continuar con brooks alimentación normal. Si tiene malestar estomacal, coma alimentos suaves bajos en grasa, mee arroz sin condimentar, priyank a la ellie, pan nathalie y yogur.     · Krystina abundantes líquidos (a menos que brooks médico le indique lo contrario).     · Podría notar que no evacua el intestino con regularidad paola después de la cirugía. [place] es común. Trate de evitar el estreñimiento y de no hacer esfuerzos cuando evacua el intestino. Jesse vez quiera sarahy un suplemento de [employer]. Si no ha evacuado el intestino después de un par de días, pregúntele a brooks médico si puede sarahy un laxante suave. Medicamentos    · Brooks médico le dirá si puede volver a sarahy juan carlos medicamentos y cuándo puede volver a hacerlo. También le dará indicaciones sobre cualquier medicamento nuevo que deba sarahy usted.     · Si angelita aspirina o cualquier otro medicamento que previene los coágulos de Precious, pregúntele a brooks médico si debería volver a tomarlo y en qué momento. Asegúrese de entender exactamente lo que brooks médico quiere que tess.     · Sea carmen con los medicamentos. Baron los analgésicos exactamente según las indicaciones. ? Si el médico le recetó un analgésico, tómelo según las indicaciones. ? Si no está tomando un analgésico recetado, pregúntele a brooks médico si puede sarahy andrea de [employer].     · Si brooks médico le recetó antibióticos, tómelos según las indicaciones. No deje de tomarlos por el hecho de sentirse mejor. Debe sarahy todos los antibióticos hasta terminarlos.     · Si le parece que el analgésico le está produciendo malestar estomacal:  ? Baron el analgésico después de las comidas (a menos que brooks médico le haya indicado lo contrario). ? Pídale al médico un analgésico diferente.    Cuidado de la incisión    · Si tiene tiras de cinta [employer] obdulia (incisión) que hizo el [place], déjeselas puestas melecio semana o hasta que se caigan por sí solas. O siga las instrucciones de brooks médico para quitarse la cinta [place].     · Lave diariamente la eneida con Birdie Saige tibia y séquela con toques suaves de toalla. No use peróxido de hidrógeno (agua [place]) ni alcohol, los cuales pueden retardar la curación. Podría cubrir la eneida con melecio venda de gasa si supura o roza contra la ropa. Cambie la venda todos los emmanuelle.     · Mantenga la eneida limpia y [place] Pafos. Otras instrucciones    · Podría tener un leve sangrado vaginal. Use toallas sanitarias si es necesario. No se tess lavados vaginales ni utilice tampones. La atención de seguimiento es melecio parte clave de brooks tratamiento y seguridad. Asegúrese de hacer y acudir a todas las citas, y llame a brooks médico si está teniendo problemas. También es melecio buena idea saber los resultados de juan carlos exámenes y mantener melecio lista de los medicamentos que angelita. ¿Cuándo debe pedir ayuda? Llame al 911  en cualquier momento que piense que puede necesitar atención de [place]. Por ejemplo, llame si:    · Se desmayó (perdió el conocimiento).     · Tiene dolor en el pecho, le falta el aire o tose Precious. Llame a brooks médico ahora mismo o busque atención médica inmediata si:    · Tiene dolor que no mejora después de sarahy analgésicos.     · No puede evacuar heces ni gases.     · Tiene flujo vaginal cuya cantidad ha aumentado o que tiene mal olor.     · Tiene malestar estomacal o no puede beber líquido.     · Tiene puntos de sutura flojos o se le abre la incisión.     · Piotr de color goins brillante singh empapado el vendaje sobre la incisión.     · Tiene señales de infección, tales mee:  ? Aumento del dolor, la hinchazón, la temperatura o el enrojecimiento. ? Vetas rojizas que salen de la incisión. ? Pus que sale de la incisión.   ? Shara Brisker.     · Tiene sangrado vaginal de color goins vivo que empapa melecio o más toallas sanitarias en melecio hora, o tiene coágulos grandes.     · Tiene señales de un coágulo de mabel en la pierna (llamado trombosis venosa profunda), tales mee:  ? Dolor en la pantorrilla, detrás de la rodilla, el muslo o la janiya. ? Enrojecimiento e hinchazón de la pierna. Vigile de cerca los cambios en brooks mary y asegúrese de comunicarse con brooks médico si:  ¿Dónde puede encontrar más información en inglés? Vaya a http://www.gray.com/  Reba M280 en la búsqueda para aprender más acerca de \"Histerectomía abdominal: Qué esperar en el hogar. \"  Revisado: 11 febrero, 2021               Versión del contenido: 13.0  © 1877-1779 Healthwise, Incorporated. Las instrucciones de cuidado fueron adaptadas bajo licencia por [employer] (which disclaims liability or warranty for this information). Si usted tiene [place] afección médica o sobre estas instrucciones, siempre pregunte a brooks profesional de mary. uTaP, [employer] niega toda garantía o responsabilidad por brooks uso de esta información.

## 2022-02-23 NOTE — DISCHARGE SUMMARY
Discharge Summary     Name: Clara Alonso MRN: [id number]  SSN: [id number]    YOB: 1970  Age: 49 y.o. Sex: female      Allergies: Patient has no known allergies. Admit Date: 2/21/2022    Discharge Date: 2/23/2022      Admitting Physician: Donna Rivera MD     * Admission Diagnoses:    89NS L2  w/ enlarged fibroid uterus, severe pelvic pain, Left ovarian cyst with Mural Nodule    * Discharge Diagnoses:   Hospital Problems as of 2/23/2022 Never Reviewed          Codes Class Noted - Resolved POA    H/O: hysterectomy ICD-10-CM: Z90.710  ICD-9-CM: V88.01  2/23/2022 - Present Unknown               * Procedures:  JANNETTE, BSO     * Discharge Condition: [facility] Course: Normal hospital course for this procedure. Significant Diagnostic Studies: No results found for this or any previous visit (from the past 24 hour(s)). * Disposition: Home    Discharge Medications:   Current Discharge Medication List      START taking these medications    Details   acetaminophen (TYLENOL) 500 mg tablet Take 2 Tablets by mouth every six (6) hours as needed for Pain. Qty: 90 Tablet, Refills: 0  Start date: 2/23/2022      !! gabapentin (NEURONTIN) 300 mg capsule Take 1 Capsule by mouth three (3) times daily. Qty: 90 Capsule, Refills: 0  Start date: 2/23/2022      oxyCODONE IR (ROXICODONE) 5 mg immediate release tablet Take 1-2 Tablets by mouth every four (4) hours as needed for Pain for up to 14 days. Max Daily Amount: 60 mg.  Qty: 30 Tablet, Refills: 0  Start date: 2/23/2022, End date: 3/9/2022    Associated Diagnoses: Complex ovarian cyst; Enlarged uterus; Fibroid; Generalized abdominal pain       !! - Potential duplicate medications found. Please discuss with provider. CONTINUE these medications which have NOT CHANGED    Details   ibuprofen (MOTRIN) 800 mg tablet Take 1 Tablet by mouth every six (6) hours as needed for Pain.   Qty: 60 Tablet, Refills: 4      multivitamin with iron (DAILY MULTI-VITAMINS/IRON) tablet Take 1 Tab by mouth daily. !! gabapentin (NEURONTIN) 300 mg capsule Take 1 Capsule by mouth three (3) times daily. Qty: 60 Capsule, Refills: 3      ezetimibe-simvastatin (VYTORIN) 10-40 mg per tablet ezetimibe 10 mg-simvastatin 40 mg tablet   Take 1 tablet every day by oral route at bedtime for 30 days. !! - Potential duplicate medications found. Please discuss with provider. * Follow-up Care/Patient Instructions: Activity: No sex, douching, or tampons for 6 weeks or as directed by your physician. No heavy lifting for 6 weeks. No driving while taking pain medication.   Diet: Resume pre-hospital diet  Wound Care: Keep wound clean and dry    Follow-up Information     Follow up With Specialties Details Why Contact Info    David Felipe NP Nurse Practitioner, Family Medicine   [address]  [phone]

## 2022-02-25 NOTE — PROGRESS NOTES
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

## 2022-03-18 PROBLEM — Z90.710 H/O: HYSTERECTOMY: Status: ACTIVE | Noted: 2022-02-23

## 2022-03-18 PROBLEM — D21.9 FIBROID: Status: ACTIVE | Noted: 2022-02-11

## 2022-03-19 PROBLEM — N94.19 DYSPAREUNIA DUE TO MEDICAL CONDITION IN FEMALE: Status: ACTIVE | Noted: 2022-02-11

## 2022-03-19 PROBLEM — N83.209 CYST OF OVARY: Status: ACTIVE | Noted: 2022-02-11

## 2022-03-19 PROBLEM — N85.2 ENLARGED UTERUS: Status: ACTIVE | Noted: 2022-02-11

## 2022-03-19 PROBLEM — R32 URINARY INCONTINENCE: Status: ACTIVE | Noted: 2022-02-11

## 2022-03-19 PROBLEM — N81.10 VAGINAL PROLAPSE: Status: ACTIVE | Noted: 2022-01-19

## 2022-03-19 PROBLEM — R10.84 GENERALIZED ABDOMINAL PAIN: Status: ACTIVE | Noted: 2022-01-19

## 2023-06-16 PROBLEM — R10.31 CHRONIC RLQ PAIN: Status: ACTIVE | Noted: 2023-06-16

## 2023-06-16 PROBLEM — K59.09 CHRONIC CONSTIPATION: Status: ACTIVE | Noted: 2023-06-16

## 2023-06-16 PROBLEM — R10.2 CHRONIC PELVIC PAIN IN FEMALE: Status: ACTIVE | Noted: 2023-06-16

## 2023-06-16 PROBLEM — G89.29 CHRONIC PELVIC PAIN IN FEMALE: Status: ACTIVE | Noted: 2023-06-16

## 2023-06-16 PROBLEM — G89.29 CHRONIC RLQ PAIN: Status: ACTIVE | Noted: 2023-06-16

## 2023-06-16 PROBLEM — N81.10 PELVIC ORGAN PROLAPSE QUANTIFICATION STAGE 3 CYSTOCELE: Status: ACTIVE | Noted: 2023-06-16

## 2023-06-26 ENCOUNTER — TELEPHONE (OUTPATIENT)
Dept: GASTROENTEROLOGY | Age: 53
End: 2023-06-26

## 2023-06-26 NOTE — TELEPHONE ENCOUNTER
Called pt to schedule colonoscopy  / per pt she was at [address] [facility] and wcb when she was able to talk

## 2023-07-17 ENCOUNTER — TELEPHONE (OUTPATIENT)
Dept: GASTROENTEROLOGY | Age: 53
End: 2023-07-17

## 2023-08-30 ENCOUNTER — OFFICE VISIT (OUTPATIENT)
Dept: UROGYNECOLOGY | Age: 53
End: 2023-08-30
Payer: COMMERCIAL

## 2023-08-30 DIAGNOSIS — N81.89 WEAKNESS OF PELVIC FLOOR: ICD-10-CM

## 2023-08-30 DIAGNOSIS — N81.10 VAGINAL PROLAPSE: Primary | ICD-10-CM

## 2023-08-30 DIAGNOSIS — N39.41 URGE INCONTINENCE: ICD-10-CM

## 2023-08-30 PROCEDURE — 99215 OFFICE O/P EST HI 40 MIN: CPT | Performed by: OBSTETRICS & GYNECOLOGY

## 2023-08-30 NOTE — PROGRESS NOTES
[facility] UROGYNECOLOGY  [address] [facility]  Dept: [phone]        PCP:  LOVE Cuevas NP    8/30/2023    Patient was seen by Jabier Rogers on 01/19/2022 for Prolapse and Incontinence     HPI:  I am being asked to see this patient in consultation by Dr. Michelle Hardin   for Follow-up  . She was last here 1/19/22 but was having a lot of pain and I referred her to GYN. She had a hysterectomy and this got rid of her pain. Ms. Elicia Ruiz has been experiencing prolapse for 8-9 months. The prolapse does cause her pain and/or pressure. She have tried to splint to complete urination, a BM, or for comfort. Full bladder/ Sexual [employer] makes her prolapse symptoms worse. Emptied bladder makes her prolapse symptoms better. She has not tried a pessary, she has tried physical therapy, she has not  had surgery for her POP. Ms. Elicia Ruiz  has been leaking urine for 2 years. She leaks urine both daytime and night time . She does leak urine with cough, laugh, sneeze and activity. She does leak urine with urgency. She  uses thin and goes through 1. She leaks 0 times per day. When she leaks she leaks small amounts. She has not tried PT, she has not tried medication . She has not had procedures/surgery for this in the past.     With both urge incontinence and stress incontinence, Urge incontinence bothers her the most.     She voids 6-7 times during the day. She voids 0 times over night. She has 7 BM per week, and does not strain. She drinks 2 caffeine drinks beverages per day. 2 glasses of hot tea  She uses 0 artificial sweeteners per day. She drinks 0 alcoholic beverages per week. She have  pelvic surgery in the past. SARITHA, BSO on 2/21/22 with Dr Michelle Hardin. Her last PAP: 2021    Her last Colonoscopy: N/A  Her last Mammogram: 2021    She does not have a history of DM. She does not have a history of sleep apnea.     Tobacco: No    Sexual History: single partner,

## 2023-08-30 NOTE — ASSESSMENT & PLAN NOTE
We discussed the epidemiology, pathogenesis and etiology of pelvic organ prolapse. This is a chronic condition that has progressed. We discussed the potential risk factors which include genetics and environmental factors, such as childbirth, aging, menopause, straining, and previous surgery. I offered her management options which included nothing, pessary, and surgery. She is interested in: surgery. Pessary info was given today as well. All in [address] [address]. Decisions regarding major surgery were discussed today.

## 2023-10-17 ENCOUNTER — PROCEDURE VISIT (OUTPATIENT)
Dept: UROGYNECOLOGY | Age: 53
End: 2023-10-17
Payer: COMMERCIAL

## 2023-10-17 ENCOUNTER — OFFICE VISIT (OUTPATIENT)
Dept: UROGYNECOLOGY | Age: 53
End: 2023-10-17
Payer: COMMERCIAL

## 2023-10-17 DIAGNOSIS — N39.3 STRESS INCONTINENCE OF URINE: ICD-10-CM

## 2023-10-17 DIAGNOSIS — N81.10 VAGINAL PROLAPSE: Primary | ICD-10-CM

## 2023-10-17 DIAGNOSIS — N81.89 WEAKNESS OF PELVIC FLOOR: ICD-10-CM

## 2023-10-17 PROBLEM — G89.29 CHRONIC RLQ PAIN: Status: RESOLVED | Noted: 2023-06-16 | Resolved: 2023-10-17

## 2023-10-17 PROBLEM — R10.31 CHRONIC RLQ PAIN: Status: RESOLVED | Noted: 2023-06-16 | Resolved: 2023-10-17

## 2023-10-17 PROBLEM — D21.9 FIBROID: Status: RESOLVED | Noted: 2022-02-11 | Resolved: 2023-10-17

## 2023-10-17 PROBLEM — N85.2 ENLARGED UTERUS: Status: RESOLVED | Noted: 2022-02-11 | Resolved: 2023-10-17

## 2023-10-17 PROBLEM — R10.84 GENERALIZED ABDOMINAL PAIN: Status: RESOLVED | Noted: 2022-01-19 | Resolved: 2023-10-17

## 2023-10-17 PROCEDURE — 51797 INTRAABDOMINAL PRESSURE TEST: CPT | Performed by: OBSTETRICS & GYNECOLOGY

## 2023-10-17 PROCEDURE — 99215 OFFICE O/P EST HI 40 MIN: CPT | Performed by: OBSTETRICS & GYNECOLOGY

## 2023-10-17 PROCEDURE — 51729 CYSTOMETROGRAM W/VP&UP: CPT | Performed by: OBSTETRICS & GYNECOLOGY

## 2023-10-17 PROCEDURE — 51784 ANAL/URINARY MUSCLE STUDY: CPT | Performed by: OBSTETRICS & GYNECOLOGY

## 2023-10-17 PROCEDURE — 51741 ELECTRO-UROFLOWMETRY FIRST: CPT | Performed by: OBSTETRICS & GYNECOLOGY

## 2023-10-17 NOTE — PROGRESS NOTES
following results:     PVR: 3cc      CMG:  The patient's Prolapse was reduced with scopettes. Multichannel cystometry was performed. A sterile 7 [place] double lumen catheter was placed in the bladder using sterile techniques. A 7 [place] abdominal pressure catheter was placed in the rectum. The catheters were zeroed at baseline. Sterile water was used for bladder infusion at the rate of 50ml per minute. EMG was performed. First Sensation: 196ml  First Desire: n/a  Strong Desire: n/a  Capacity: 257ml      DO: no  DO w Leak: no  SHAWN: yes    Leak Point Pressures were tested at 200cc    VLPP: 67cm H2O    UPP:  UPP was performed with 200cc narinder fluid in the bladder    Functional Urethral Length: 2.5cm  Max Urethral Closure Pressure: [id number]      PRESSURE FLOW STUDY:  At full bladder capacity sensation, a pressure flow study was performed. The patient voided with all pressure transducers and EMG electrodes in place. The results are as follows:    Voided Volume: 313 ml  Peak Flow Rate: 14 ml/s  Detrusor Pressure During peak flow: 12.6 cmH2O  Average Flow Rate: 6 ml/s  PVR after pressure flow study: 3ml    EMG:  Electrode reading normal? yes    Complications: None    Impression:     SHAWN: Urodynamics reveal the presence of stress urinary incontinence with good urethral indices. Given those findings, she is a good candidate for a sub urethral sling of either the retropubic or trans-obturator type. 1. Vaginal prolapse       No follow-up provider specified.     Merrill Macedo DO

## 2023-10-17 NOTE — ASSESSMENT & PLAN NOTE
Robotic Assisted Laparoscopic Sacrocolpopexy    We discussed the epidemiology, pathogenesis and etiology of pelvic organ prolapse. We discussed the potential risk factors which include genetics and environmental factors, such as childbirth, aging, menopause, straining, and previous surgery. I offered her management options which included nothing, pessary, and surgery. We discussed her options of correcting the prolapse through a vaginal approach and laparoscopic approach. We also discussed repairing the vaginal prolapse with mesh and the option to do this without mesh. She has decided she would like to have a Robotic assisted laparoscopic prolapse repair (sacrocolpopexy) with Y mesh, midurethral sling and camera in the bladder (cystoscopy). Possible vaginal prolapse repair. I described the surgical technique to be employed for her upcoming surgery. We discussed the anticipated postoperative course. We discussed using the [address] [place] [address] robot for assisting in the surgical procedure. I explained the function and purpose of the robot which greatly enhances my ability to perform the reconstruction. We discussed that the care may not be able to be completed laparoscopically and that a laparotomy may become necessary. There is a >80% success rate. We discussed the cause of uterine prolapse. We discussed that the uterus itself is usually normal. We discussed the options of prolapse repair with hysterectomy or prolapse repair with uterine suspension. We discussed the risk of uterine cancer. We discussed that some patients do fail, although not all require another surgery. We discussed the risks of repair which include pain, dysparunia, bladder and/or bowel dysfunction and sexual dysfunction. We discussed the properties of polypropylene mesh. We discussed the inert nature and the potential for complication, including infections, rejection, and erosion. The risk of erosion is <10%.      We discussed the FDA warning

## 2023-10-17 NOTE — PROGRESS NOTES
Discharge Summary       PATIENT ID: Magaly Mcghee  MRN: [id number]   YOB: 1960    DATE OF ADMISSION: 10/25/2022  8:04 PM    DATE OF DISCHARGE: 11/9/2022  PRIMARY CARE PROVIDER: Veronica Blair DO     ATTENDING PHYSICIAN: Nuria Rojas MD   DISCHARGING PROVIDER: Naeem Cannon MD    To contact this individual call [phone] and ask the  to page. If unavailable ask to be transferred the Adult Hospitalist Department. CONSULTATIONS: IP CONSULT TO HOSPITALIST  IP CONSULT TO NEPHROLOGY  IP CONSULT TO GASTROENTEROLOGY  IP CONSULT TO PULMONOLOGY    PROCEDURES/SURGERIES: * No surgery found *    ADMISSION SUMMARY AND HOSPITAL COURSE:     Magaly Mcghee is a 65 y.o. female with Stage IV/V CKD, T2DM (on insulin), GERD, HTN (uncontrolled), and HLD who presented to the ED complaining of severe diarrhea with work-up revealing RAYMOND on CKD, metabolic acidosis, and C Diff + (now on PO Vanc x 10 days).  Admission c/b recurrent episodes of morning hypoglycemia and now respiratory distress with rising O2 requirement with CT Scan revealing multifocal PNA and diagnosed COVID-19+ (On Dexamethasone, Pulmonology consulted, broadened to Cefepime+Vancomycin)     Acute hypoxic respiratory failure- resolved to room air   Multifocal PNA, + COVID-19 on 11/1/22, viral with possible superimposed bacterial Pseudomonas Pneumonia  Cefepime x 7 days, end 11/7  Continue dexamethasone x 10 days, last dose 11/9  s/p tocilizumab  Ambulate as able, incentive spirometer  SpO2 % on RA  RAYMOND superimposed on CKD stage IV/V, worsening  Metabolic Acidosis  Presented with creatinine 5.33 and BUN 42  Creatinine improving, Creatinin 6.05  Avoid nephrotoxin   Discontinued sodium bicarbonate infusion  Monitor renal function   Nephrology on board  Acute diarrhea secondary to C Difficile Infection, POA   frequent bowel movement after Questran   Oral vancomycin- last dose 11/5/2022  -CT abdomen pelvis on 11/1 no [facility] UROGYNECOLOGY  [address] [facility]  Dept: [phone]        PCP:  LOVE Canada NP    10/17/2023        HPI:  Reba Rodriguez is here to discuss testing results and surgical options. She has read through the material previously given to her explaining her surgical options for her chronic condition that has shown continued progression over time. I have reviewed her initial examination and POP-Q. I have reviewed her urodynamic testing. No results found for this visit on 10/17/23. There were no vitals taken for this visit. We have reviewed her POP-q exam together. We have reviewed her urodynamic study results together. 8/30/2023     4:00 PM   Pelvic Organ Prolapse Quantification   Anterior Wall (Aa) 1   Anterior Wall (Ba) 1   Cervix or Cuff (C) -6   Genital Haitus (gh) 5   Perineal Body (pb) 4   Total Vaginal Length (tvl) 9   Posterior Wall (Ap) -1   Posterior Wall (Bp) -1   Posterior Fornix (D) X          1. Vaginal prolapse  Assessment & Plan:  Robotic Assisted Laparoscopic Sacrocolpopexy    We discussed the epidemiology, pathogenesis and etiology of pelvic organ prolapse. We discussed the potential risk factors which include genetics and environmental factors, such as childbirth, aging, menopause, straining, and previous surgery. I offered her management options which included nothing, pessary, and surgery. We discussed her options of correcting the prolapse through a vaginal approach and laparoscopic approach. We also discussed repairing the vaginal prolapse with mesh and the option to do this without mesh. She has decided she would like to have a Robotic assisted laparoscopic prolapse repair (sacrocolpopexy) with Y mesh, midurethral sling and camera in the bladder (cystoscopy). Possible vaginal prolapse repair. I described the surgical technique to be employed for her upcoming surgery.  We discussed the anticipated acute finding   -afebrile and no leukocytosis  -GI on board,   Hypocalcemia  -continue with calcium carbonate  -improving  Hypomagnesemia:   -resolved  Anxiety  Lorazepam as needed BID  Declined to see a psychiatrist   Hypertensive emergency, POA, improving   previously on Cardene infusion, since weaned off  BP improving   Continuing carvedilol and nifedipine, added hydralazine   She also has as needed hydralazine IV with parameters  Asymptomatic bacteriuria, POA  Urinalysis with bacteria however otherwise unremarkable  Regardless, she is already on antibiotics as above  Chronic normocytic anemia  Repeated Hgb 7.7, stable  T2DM uncontrolled  A1c 9.2  Sugars better controlled  Hyperglycemia likely related to steroids  Continue long acting to 18 units qhs, lispro 4 unitis, sliding scale and monitor finger stick glucose   Hx of CVA  no residuals weakness  Continuing statin  Hyperlipidemia  Stable  Continuing statin  Obesity  BMI of 31  Counseled on healthy dietary choices     Code status:DNR     Prophylaxis: UFH      DISCHARGE DIAGNOSES / PLAN:      Acute hypoxic respiratory failure   Multifocal PNA, + COVID-19 on 11/1/22, viral with superimposed bacterial Pseudomonas Pneumonia  Completed abx, last dose on decadron 11/9  Resolved, SpO2 % on RA  RAYMOND superimposed on CKD stage IV/V, worsening  Metabolic Acidosis  Presented with creatinine 5.33 and BUN 42  Creatinine improving, Creatinin 6.05  Avoid nephrotoxin   Outpatient follow up with Nephrology   Acute diarrhea secondary to C Difficile Infection, POA   -improved  -continue questran   Hypocalcemia  -improving, continue with calcium carbonate  Hypomagnesemia:   -resolved  Anxiety  -stable  Hypertensive emergency, POA, improving   -improved, continuing carvedilol,nifedipine and hydralazine   Asymptomatic bacteriuria, POA  -Unremarkable  Chronic normocytic anemia  Repeated Hgb 7.7, stable  T2DM uncontrolled  Continue long acting to 18 units qhs, lispro 4 unitis, sliding scale and monitor finger stick glucose   Hx of CVA  Continuing statin  Hyperlipidemia  Stable  Continuing statin  Obesity  BMI of 31  Counseled on healthy dietary choices        NOTIFY YOUR PHYSICIAN FOR ANY OF THE FOLLOWING:   Fever over 101 degrees for 24 hours. Chest pain, shortness of breath, fever, chills, nausea, vomiting, diarrhea, change in mentation, falling, weakness, bleeding. Severe pain or pain not relieved by medications, as well as any other signs or symptoms that you may have questions about. FOLLOW UP APPOINTMENTS:    Follow-up Information       Follow up With Specialties Details Why [address] [facility], [address] Derik Frances DO Family Medicine In one week   [address] [facility] [id number] [phone]       Zofia Dixon MD in one week    DIET: Diabetic Diet    ACTIVITY: Activity as tolerated    EQUIPMENT needed: None    DISCHARGE MEDICATIONS:  Current Discharge Medication List        START taking these medications    Details   cholestyramine-aspartame (QUESTRAN LIGHT) 4 gram packet Take 0.5 Packets by mouth two (2) times daily (with meals) for 15 days. Qty: 15 Packet, Refills: 0  Start date: 11/9/2022, End date: 11/24/2022      hydrALAZINE (APRESOLINE) 50 mg tablet Take 1 Tablet by mouth three (3) times daily for 30 days. Qty: 90 Tablet, Refills: 0  Start date: 11/9/2022, End date: 12/9/2022      pantoprazole (PROTONIX) 40 mg tablet Take 1 Tablet by mouth Daily (before breakfast). Qty: 30 Tablet, Refills: 0  Start date: 11/10/2022      calcium carbonate (TUMS) 200 mg calcium (500 mg) chew Take 1 Tablet by mouth three (3) times daily (with meals) for 6 days. Qty: 18 Tablet, Refills: 0  Start date: 11/9/2022, End date: 11/15/2022           CONTINUE these medications which have CHANGED    Details   carvediloL (COREG) 25 mg tablet Take 1 Tablet by mouth two (2) times daily (with meals).   Qty: 60 Tablet, Refills: 0  Start date: 11/9/2022           CONTINUE these medications which have NOT CHANGED    Details   NIFEdipine ER (ADALAT CC) 30 mg ER tablet Take 30 mg by mouth two (2) times a day. aspirin 81 mg chewable tablet Take 81 mg by mouth in the morning.      sodium bicarbonate 650 mg tablet Take 650 mg by mouth two (2) times a day. insulin glargine (LANTUS,BASAGLAR) 100 unit/mL (3 mL) inpn 26 Units by SubCUTAneous route nightly. atorvastatin (LIPITOR) 40 mg tablet TAKE 1 TABLET BY MOUTH EVERY DAY  Qty: 30 Tab, Refills: 7    Associated Diagnoses: Hypercholesteremia      NOVOFINE 32 32 gauge x 1/4\" ndle USE AS DIRECTED  Qty: 100 Pen Needle, Refills: 3      Blood-Glucose Meter monitoring kit CONTOUR METER; Use as directed  Qty: 1 Kit, Refills: 0      !! glucose blood VI test strips (ASCENSIA CONTOUR) strip Daily  Qty: 50 Package, Refills: 11    Associated Diagnoses: Type I (juvenile type) diabetes mellitus without mention of complication, uncontrolled      !! glucose blood VI test strips (BLOOD GLUCOSE TEST) strip As directed    Qty: 1 Package, Refills: 12    Associated Diagnoses: Type I (juvenile type) diabetes mellitus without mention of complication, uncontrolled       !! - Potential duplicate medications found. Please discuss with provider.         STOP taking these medications       bumetanide (BUMEX) 1 mg tablet Comments:   Reason for Stopping:               DISPOSITION:    Home With:   OT  PT  HH  RN       Long term SNF/Inpatient Rehab   x Independent/assisted living    Hospice    Other:       PATIENT CONDITION AT DISCHARGE:     Functional status    Poor     Deconditioned    x Independent      Cognition    x Lucid     Forgetful     Dementia      Catheters/lines (plus indication)    Hough     PICC     PEG    x None      Code status    x Full code     DNR      PHYSICAL EXAMINATION AT DISCHARGE:  Patient Vitals for the past 24 hrs:   Temp Pulse Resp BP SpO2   11/09/22 1400 -- 64 -- -- --   11/09/22 1200 -- 61 -- -- --   11/09/22 1120 98.3 °F (36.8 °C) 66 18 (!) 142/57 98 % 11/09/22 1000 -- 69 -- -- --   11/09/22 0825 98 °F (36.7 °C) 72 17 (!) 165/56 96 %   11/09/22 0600 -- 65 -- -- --   11/09/22 0412 98.5 °F (36.9 °C) 76 16 (!) 159/59 98 %   11/09/22 0400 -- 73 -- -- --   11/09/22 0200 -- 63 -- -- --   11/09/22 0056 98.2 °F (36.8 °C) 61 16 (!) 125/92 98 %      General:          Alert, cooperative, no distress, appears stated age. HEENT:           Atraumatic, anicteric sclerae, pink conjunctivae                          No oral ulcers, mucosa moist, throat clear, dentition fair  Neck:               Supple, symmetrical  Lungs:             Clear to auscultation bilaterally. No Wheezing or Rhonchi. No rales. Chest wall:      No tenderness  No Accessory muscle use. Heart:              Regular  rhythm,  No  murmur   No edema  Abdomen:        Soft, non-tender. Not distended. Bowel sounds normal  Extremities:     No cyanosis. No clubbing,                            Skin turgor normal, Capillary refill normal  Skin:                Not pale. Not Jaundiced  No rashes   Psych:             Not anxious or agitated. Neurologic:      Alert, moves all extremities, answers questions appropriately and responds to commands       . lab  CHRONIC MEDICAL DIAGNOSES:  Problem List as of 11/9/2022 Date Reviewed: 8/23/2022            Codes Class Noted - Resolved    Diarrhea ICD-10-CM: R19.7  ICD-9-CM: 787.91  10/25/2022 - Present        RAYMOND (acute kidney injury) ([facility] ICD-10-CM: N17.9  ICD-9-CM: 584.9  10/25/2022 - Present        Vision Impairment ICD-10-CM: H54.7  ICD-9-CM: 369.9  4/25/2011 - Present        Leiomyoma of uterus, unspecified ICD-10-CM: D25.9  ICD-9-CM: 218.9  4/25/2011 - Present        GERG - Esophagitis- reflux ICD-10-CM: K20.90  ICD-9-CM: 530.10  4/25/2011 - Present        Intertrigo ICD-10-CM: L53.8  ICD-9-CM: 695.89  4/25/2011 - Present        Hypercholesteremia ICD-10-CM: E78.00  ICD-9-CM: 272.0  4/25/2011 - Present        Diabetes mellitus type 2, controlled ([facility] ICD-10-CM: E11.9  ICD-9-CM: 250.00  4/25/2011 - Present        HTN (hypertension) ICD-10-CM: I10  ICD-9-CM: 401.9  4/25/2011 - Present           Greater than 45 minutes were spent with the patient on counseling and coordination of care    Signed:   Colonel Marley MD  11/9/2022  12:33 PM

## 2023-11-13 ENCOUNTER — TELEPHONE (OUTPATIENT)
Dept: UROGYNECOLOGY | Age: 53
End: 2023-11-13

## 2023-11-13 NOTE — TELEPHONE ENCOUNTER
Ms. Kimberly Plasencia was contacted on Monday 11/06/2023 with the  service about her surgery consent. Her PCP sent her to Cardiology to follow up with some abnormal lab results, I contacted her to find out if she had been scheduled for a visit with her cardiologist and if she would be able to get her clearance in by the 11/10/2023 deadline. She said she was scheduled for 11/10/2023 at 2:30PM. I informed the pt that we had to have her clearance by noon for us to be able to keep her current surgery date, as that was the last day I could hold that block time. Pt said she was going to follow up with cardiology and get her time moved up to be within the deadline. I did not hear back from the pt. I then called the pt back on Wednesday 11/08/2023 with the  service to speak with the pt again. She was again informed that I needed her clearance back by Friday 11/10/2023 by noon for us to keep her current surgery date, pt said she understood and was going to have it in by noon. On Friday 11/10/2023 I tried contacting the pt first thing in the morning with the  service and her phone went straight to voicemail, we left a message for her to call me back as soon as possible. Ms. Kimberly Plasencia didn't contact me back at all. We did end up having to give up that block time for the OR as we had not received a clearance letter or confirmation from Ms. Bauer or her provider that she was cleared. I came in this morning 11/13/2023 to find Ms. Bauer's cardiologist had sent a clearance letter at 2:37PM on 11/10/2023. I again tried contacting the patient with  services and was met with the pt's voicemail. I left a message stating to again call us back as soon as possible in regards to her surgery date.

## 2023-11-15 ENCOUNTER — PREP FOR PROCEDURE (OUTPATIENT)
Dept: UROGYNECOLOGY | Age: 53
End: 2023-11-15

## 2023-11-15 DIAGNOSIS — N39.3 STRESS INCONTINENCE, FEMALE: ICD-10-CM

## 2023-11-20 NOTE — PERIOP NOTE
Patient verified name and  via [employer] . Order for consent not found in EHR- patient verifies procedure. Type 3 surgery, Phone assessment complete. Orders not received. Labs per surgeon: none  Labs per anesthesia protocol: cbc,bmp    Pt states recent lab work at PCP -  Yue Guidry @ [facility]. Lab report requested via faxed request.  Charge RN to f/u. Cardiology [facility] with clearance in Care Everywhere dated 10/24/23. Patient answered medical/surgical history questions at their best of ability. All prior to admission medications documented in EPIC. Patient instructed to take the following medications the day of surgery according to anesthesia guidelines with a small sip of water: none  Hold all vitamins 7 days prior to surgery and NSAIDS 5 days prior to surgery. Prescription meds to hold:none  Patient instructed on the following:    > Arrive at [facility], time of arrival to be called the day before by 1700  > NPO after midnight, unless otherwise indicated, including gum, mints, and ice chips  > Responsible adult must drive patient to the hospital, stay during surgery, and patient will need supervision 24 hours after anesthesia  > Use non moisturizing soap in shower the night before surgery and on the morning of surgery  > All piercings must be removed prior to arrival.    > Leave all valuables (money and jewelry) at home but bring insurance card and ID on DOS.   > Do not wear make-up, nail polish, lotions, cologne, perfumes, powders, or oil on skin. Artificial nails are not permitted.

## 2023-11-20 NOTE — PERIOP NOTE
The following records have been requested from [facility] - fax # [phone], office # [phone]: Attn Medical Records:    Please send last office visit and lab report ( CBC and BMP ) via fax to [phone] for anesthesia review prior to upcoming surgery. Thank you!

## 2023-11-21 NOTE — PROGRESS NOTES
Received last PCP Office Note and Labs, within anesthesia guidelines, scanned into Media if needed for reference.

## 2023-11-24 NOTE — H&P
After taking all of this into account, she elects to proceed with Robotic assisted laparoscopic prolapse repair (sacrocolpopexy) with Y mesh, midurethral sling and camera in the bladder (cystoscopy). Possible vaginal prolapse repair. She is not a smoker, she is not on any blood thinners, she is not on any weight loss medications, she does not see a cardiologist for any reason. 2. Weakness of pelvic floor  Assessment & Plan:  I recommend PT post op. 3. Stress incontinence of urine  Assessment & Plan:  Stress Incontinence  We discussed the differential diagnosis of urinary incontinence. We also discussed the pathogenesis and etiology of stress urinary incontinence. I explained the epidemiology of incontinence. I offered her options which include nothing, physical therapy, barrier treatment, and surgery. She is interested in surgical treatment. I described the surgical technique to be employed for her upcoming surgery. We discussed the anticipated postoperative course. TVT cure rates at 6 years is up to 85%. We discussed the properties of polypropylene mesh. We discussed the inert nature and the potential for complication, including infections, rejection, and erosion. The risk of erosion is <10%. We discussed the FDA warning on mesh use. Risks, benefits, indications, and alternatives of the surgery were discussed. Risks reviewed include bleeding, infections, injury to pelvic organs (bladder, nerves, vessels, urethra, and ureters), recurrence, urinary retention, dyspareunia, anesthetic complications, and death. We discussed that other complications could arise and that the list is too long to discuss comprehensively.         Signed By: Jassi Dotson DO    November 24, 2023

## 2023-11-27 ENCOUNTER — ANESTHESIA EVENT (OUTPATIENT)
Dept: SURGERY | Age: 53
End: 2023-11-27
Payer: COMMERCIAL

## 2023-11-28 ENCOUNTER — ANESTHESIA (OUTPATIENT)
Dept: SURGERY | Age: 53
End: 2023-11-28
Payer: COMMERCIAL

## 2023-11-28 ENCOUNTER — HOSPITAL ENCOUNTER (OUTPATIENT)
Age: 53
Setting detail: OUTPATIENT SURGERY
Discharge: HOME | End: 2023-11-28
Attending: OBSTETRICS & GYNECOLOGY | Admitting: OBSTETRICS & GYNECOLOGY
Payer: COMMERCIAL

## 2023-11-28 VITALS
DIASTOLIC BLOOD PRESSURE: 60 MMHG | HEART RATE: 61 BPM | HEIGHT: 60 IN | SYSTOLIC BLOOD PRESSURE: 111 MMHG | OXYGEN SATURATION: 96 % | BODY MASS INDEX: 31.71 KG/M2 | WEIGHT: 161.5 LBS | RESPIRATION RATE: 20 BRPM | TEMPERATURE: 97.8 F

## 2023-11-28 DIAGNOSIS — G89.18 POST-OP PAIN: Primary | ICD-10-CM

## 2023-11-28 PROCEDURE — 7100000000 HC PACU RECOVERY - FIRST 15 MIN: Performed by: OBSTETRICS & GYNECOLOGY

## 2023-11-28 PROCEDURE — C1781 MESH (IMPLANTABLE): HCPCS | Performed by: OBSTETRICS & GYNECOLOGY

## 2023-11-28 PROCEDURE — 6370000000 HC RX 637 (ALT 250 FOR IP): Performed by: OBSTETRICS & GYNECOLOGY

## 2023-11-28 PROCEDURE — 6360000002 HC RX W HCPCS: Performed by: OBSTETRICS & GYNECOLOGY

## 2023-11-28 PROCEDURE — 7100000001 HC PACU RECOVERY - ADDTL 15 MIN: Performed by: OBSTETRICS & GYNECOLOGY

## 2023-11-28 PROCEDURE — 2580000003 HC RX 258: Performed by: ANESTHESIOLOGY

## 2023-11-28 PROCEDURE — 57288 REPAIR BLADDER DEFECT: CPT | Performed by: OBSTETRICS & GYNECOLOGY

## 2023-11-28 PROCEDURE — 2709999900 HC NON-CHARGEABLE SUPPLY: Performed by: OBSTETRICS & GYNECOLOGY

## 2023-11-28 PROCEDURE — 3700000000 HC ANESTHESIA ATTENDED CARE: Performed by: OBSTETRICS & GYNECOLOGY

## 2023-11-28 PROCEDURE — 3700000001 HC ADD 15 MINUTES (ANESTHESIA): Performed by: OBSTETRICS & GYNECOLOGY

## 2023-11-28 PROCEDURE — 7100000011 HC PHASE II RECOVERY - ADDTL 15 MIN: Performed by: OBSTETRICS & GYNECOLOGY

## 2023-11-28 PROCEDURE — 2500000003 HC RX 250 WO HCPCS: Performed by: NURSE ANESTHETIST, CERTIFIED REGISTERED

## 2023-11-28 PROCEDURE — 6360000002 HC RX W HCPCS: Performed by: ANESTHESIOLOGY

## 2023-11-28 PROCEDURE — 6360000002 HC RX W HCPCS: Performed by: NURSE ANESTHETIST, CERTIFIED REGISTERED

## 2023-11-28 PROCEDURE — S2900 ROBOTIC SURGICAL SYSTEM: HCPCS | Performed by: OBSTETRICS & GYNECOLOGY

## 2023-11-28 PROCEDURE — C1771 REP DEV, URINARY, W/SLING: HCPCS | Performed by: OBSTETRICS & GYNECOLOGY

## 2023-11-28 PROCEDURE — 57425 LAPAROSCOPY SURG COLPOPEXY: CPT | Performed by: OBSTETRICS & GYNECOLOGY

## 2023-11-28 PROCEDURE — 6370000000 HC RX 637 (ALT 250 FOR IP): Performed by: ANESTHESIOLOGY

## 2023-11-28 PROCEDURE — 2500000003 HC RX 250 WO HCPCS: Performed by: OBSTETRICS & GYNECOLOGY

## 2023-11-28 PROCEDURE — 3600000009 HC SURGERY ROBOT BASE: Performed by: OBSTETRICS & GYNECOLOGY

## 2023-11-28 PROCEDURE — 3600000019 HC SURGERY ROBOT ADDTL 15MIN: Performed by: OBSTETRICS & GYNECOLOGY

## 2023-11-28 PROCEDURE — 7100000010 HC PHASE II RECOVERY - FIRST 15 MIN: Performed by: OBSTETRICS & GYNECOLOGY

## 2023-11-28 DEVICE — TRANSVAGINAL MID-URETHRAL SLING
Type: IMPLANTABLE DEVICE | Site: VAGINA | Status: FUNCTIONAL
Brand: ADVANTAGE™ SYSTEM

## 2023-11-28 DEVICE — POLYPROPYLENE MESH FOR SACROCOLPOSUSPENSION/SACROCOLPOPEXY - Y CONTOUR™
Type: IMPLANTABLE DEVICE | Site: ABDOMEN | Status: FUNCTIONAL
Brand: RESTORELLE

## 2023-11-28 RX ORDER — PHENAZOPYRIDINE HYDROCHLORIDE 95 MG/1
95 TABLET ORAL ONCE
Status: COMPLETED | OUTPATIENT
Start: 2023-11-28 | End: 2023-11-28

## 2023-11-28 RX ORDER — SODIUM CHLORIDE 0.9 % (FLUSH) 0.9 %
5-40 SYRINGE (ML) INJECTION PRN
Status: DISCONTINUED | OUTPATIENT
Start: 2023-11-28 | End: 2023-11-28 | Stop reason: HOSPADM

## 2023-11-28 RX ORDER — PROPOFOL 10 MG/ML
INJECTION, EMULSION INTRAVENOUS PRN
Status: DISCONTINUED | OUTPATIENT
Start: 2023-11-28 | End: 2023-11-28 | Stop reason: SDUPTHER

## 2023-11-28 RX ORDER — ACETAMINOPHEN 500 MG
1000 TABLET ORAL ONCE
Status: COMPLETED | OUTPATIENT
Start: 2023-11-28 | End: 2023-11-28

## 2023-11-28 RX ORDER — DOCUSATE SODIUM 100 MG/1
100 CAPSULE, LIQUID FILLED ORAL 2 TIMES DAILY
Qty: 60 CAPSULE | Refills: 0 | Status: SHIPPED | OUTPATIENT
Start: 2023-11-28 | End: 2023-12-28

## 2023-11-28 RX ORDER — HYDROMORPHONE HYDROCHLORIDE 1 MG/ML
0.5 INJECTION, SOLUTION INTRAMUSCULAR; INTRAVENOUS; SUBCUTANEOUS EVERY 5 MIN PRN
Status: DISCONTINUED | OUTPATIENT
Start: 2023-11-28 | End: 2023-11-28 | Stop reason: HOSPADM

## 2023-11-28 RX ORDER — SODIUM CHLORIDE, SODIUM LACTATE, POTASSIUM CHLORIDE, CALCIUM CHLORIDE 600; 310; 30; 20 MG/100ML; MG/100ML; MG/100ML; MG/100ML
INJECTION, SOLUTION INTRAVENOUS CONTINUOUS
Status: DISCONTINUED | OUTPATIENT
Start: 2023-11-28 | End: 2023-11-28 | Stop reason: HOSPADM

## 2023-11-28 RX ORDER — KETAMINE HYDROCHLORIDE 50 MG/ML
INJECTION, SOLUTION, CONCENTRATE INTRAMUSCULAR; INTRAVENOUS PRN
Status: DISCONTINUED | OUTPATIENT
Start: 2023-11-28 | End: 2023-11-28 | Stop reason: SDUPTHER

## 2023-11-28 RX ORDER — DEXAMETHASONE SODIUM PHOSPHATE 10 MG/ML
INJECTION INTRAMUSCULAR; INTRAVENOUS PRN
Status: DISCONTINUED | OUTPATIENT
Start: 2023-11-28 | End: 2023-11-28 | Stop reason: SDUPTHER

## 2023-11-28 RX ORDER — ONDANSETRON 2 MG/ML
4 INJECTION INTRAMUSCULAR; INTRAVENOUS
Status: DISCONTINUED | OUTPATIENT
Start: 2023-11-28 | End: 2023-11-28 | Stop reason: HOSPADM

## 2023-11-28 RX ORDER — SODIUM CHLORIDE 0.9 % (FLUSH) 0.9 %
5-40 SYRINGE (ML) INJECTION EVERY 12 HOURS SCHEDULED
Status: DISCONTINUED | OUTPATIENT
Start: 2023-11-28 | End: 2023-11-28 | Stop reason: HOSPADM

## 2023-11-28 RX ORDER — GLYCOPYRROLATE 0.2 MG/ML
INJECTION INTRAMUSCULAR; INTRAVENOUS PRN
Status: DISCONTINUED | OUTPATIENT
Start: 2023-11-28 | End: 2023-11-28 | Stop reason: SDUPTHER

## 2023-11-28 RX ORDER — MIDAZOLAM HYDROCHLORIDE 2 MG/2ML
2 INJECTION, SOLUTION INTRAMUSCULAR; INTRAVENOUS
Status: DISCONTINUED | OUTPATIENT
Start: 2023-11-28 | End: 2023-11-28 | Stop reason: HOSPADM

## 2023-11-28 RX ORDER — DIPHENHYDRAMINE HYDROCHLORIDE 50 MG/ML
12.5 INJECTION INTRAMUSCULAR; INTRAVENOUS
Status: DISCONTINUED | OUTPATIENT
Start: 2023-11-28 | End: 2023-11-28 | Stop reason: HOSPADM

## 2023-11-28 RX ORDER — BUPIVACAINE HYDROCHLORIDE 2.5 MG/ML
INJECTION, SOLUTION EPIDURAL; INFILTRATION; INTRACAUDAL PRN
Status: DISCONTINUED | OUTPATIENT
Start: 2023-11-28 | End: 2023-11-28 | Stop reason: ALTCHOICE

## 2023-11-28 RX ORDER — ONDANSETRON 4 MG/1
4 TABLET, ORALLY DISINTEGRATING ORAL 3 TIMES DAILY PRN
Qty: 10 TABLET | Refills: 0 | Status: SHIPPED | OUTPATIENT
Start: 2023-11-28

## 2023-11-28 RX ORDER — LIDOCAINE HYDROCHLORIDE 10 MG/ML
1 INJECTION, SOLUTION INFILTRATION; PERINEURAL
Status: DISCONTINUED | OUTPATIENT
Start: 2023-11-28 | End: 2023-11-28 | Stop reason: HOSPADM

## 2023-11-28 RX ORDER — POLYETHYLENE GLYCOL 3350 17 G/17G
17 POWDER, FOR SOLUTION ORAL DAILY
Qty: 578 G | Refills: 0 | Status: SHIPPED | OUTPATIENT
Start: 2023-11-28 | End: 2023-12-28

## 2023-11-28 RX ORDER — OXYCODONE HYDROCHLORIDE 5 MG/1
10 TABLET ORAL PRN
Status: COMPLETED | OUTPATIENT
Start: 2023-11-28 | End: 2023-11-28

## 2023-11-28 RX ORDER — LIDOCAINE HYDROCHLORIDE 20 MG/ML
INJECTION, SOLUTION EPIDURAL; INFILTRATION; INTRACAUDAL; PERINEURAL PRN
Status: DISCONTINUED | OUTPATIENT
Start: 2023-11-28 | End: 2023-11-28 | Stop reason: SDUPTHER

## 2023-11-28 RX ORDER — LIDOCAINE HYDROCHLORIDE AND EPINEPHRINE 10; 10 MG/ML; UG/ML
INJECTION, SOLUTION INFILTRATION; PERINEURAL PRN
Status: DISCONTINUED | OUTPATIENT
Start: 2023-11-28 | End: 2023-11-28 | Stop reason: ALTCHOICE

## 2023-11-28 RX ORDER — ROCURONIUM BROMIDE 10 MG/ML
INJECTION, SOLUTION INTRAVENOUS PRN
Status: DISCONTINUED | OUTPATIENT
Start: 2023-11-28 | End: 2023-11-28 | Stop reason: SDUPTHER

## 2023-11-28 RX ORDER — SODIUM CHLORIDE 9 MG/ML
INJECTION, SOLUTION INTRAVENOUS PRN
Status: DISCONTINUED | OUTPATIENT
Start: 2023-11-28 | End: 2023-11-28 | Stop reason: HOSPADM

## 2023-11-28 RX ORDER — MIDAZOLAM HYDROCHLORIDE 1 MG/ML
INJECTION INTRAMUSCULAR; INTRAVENOUS PRN
Status: DISCONTINUED | OUTPATIENT
Start: 2023-11-28 | End: 2023-11-28 | Stop reason: SDUPTHER

## 2023-11-28 RX ORDER — HYDROMORPHONE HYDROCHLORIDE 1 MG/ML
0.25 INJECTION, SOLUTION INTRAMUSCULAR; INTRAVENOUS; SUBCUTANEOUS EVERY 5 MIN PRN
Status: DISCONTINUED | OUTPATIENT
Start: 2023-11-28 | End: 2023-11-28 | Stop reason: HOSPADM

## 2023-11-28 RX ORDER — FENTANYL CITRATE 50 UG/ML
INJECTION, SOLUTION INTRAMUSCULAR; INTRAVENOUS PRN
Status: DISCONTINUED | OUTPATIENT
Start: 2023-11-28 | End: 2023-11-28 | Stop reason: SDUPTHER

## 2023-11-28 RX ORDER — ONDANSETRON 2 MG/ML
INJECTION INTRAMUSCULAR; INTRAVENOUS PRN
Status: DISCONTINUED | OUTPATIENT
Start: 2023-11-28 | End: 2023-11-28 | Stop reason: SDUPTHER

## 2023-11-28 RX ORDER — PROCHLORPERAZINE EDISYLATE 5 MG/ML
5 INJECTION INTRAMUSCULAR; INTRAVENOUS
Status: COMPLETED | OUTPATIENT
Start: 2023-11-28 | End: 2023-11-28

## 2023-11-28 RX ORDER — EPHEDRINE SULFATE/0.9% NACL/PF 50 MG/5 ML
SYRINGE (ML) INTRAVENOUS PRN
Status: DISCONTINUED | OUTPATIENT
Start: 2023-11-28 | End: 2023-11-28 | Stop reason: SDUPTHER

## 2023-11-28 RX ORDER — NEOSTIGMINE METHYLSULFATE 1 MG/ML
INJECTION, SOLUTION INTRAVENOUS PRN
Status: DISCONTINUED | OUTPATIENT
Start: 2023-11-28 | End: 2023-11-28 | Stop reason: SDUPTHER

## 2023-11-28 RX ORDER — HYDROMORPHONE HYDROCHLORIDE 2 MG/1
1 TABLET ORAL EVERY 6 HOURS PRN
Qty: 10 TABLET | Refills: 0 | Status: SHIPPED | OUTPATIENT
Start: 2023-11-28 | End: 2023-12-01

## 2023-11-28 RX ORDER — OXYCODONE HYDROCHLORIDE 5 MG/1
5 TABLET ORAL PRN
Status: COMPLETED | OUTPATIENT
Start: 2023-11-28 | End: 2023-11-28

## 2023-11-28 RX ADMIN — GLYCOPYRROLATE 0.4 MG: 0.2 INJECTION INTRAMUSCULAR; INTRAVENOUS at 09:38

## 2023-11-28 RX ADMIN — ROCURONIUM BROMIDE 40 MG: 10 INJECTION, SOLUTION INTRAVENOUS at 07:09

## 2023-11-28 RX ADMIN — HYDROMORPHONE HYDROCHLORIDE 0.5 MG: 1 INJECTION, SOLUTION INTRAMUSCULAR; INTRAVENOUS; SUBCUTANEOUS at 10:23

## 2023-11-28 RX ADMIN — FENTANYL CITRATE 100 MCG: 50 INJECTION, SOLUTION INTRAMUSCULAR; INTRAVENOUS at 07:09

## 2023-11-28 RX ADMIN — PROCHLORPERAZINE EDISYLATE 5 MG: 5 INJECTION INTRAMUSCULAR; INTRAVENOUS at 11:22

## 2023-11-28 RX ADMIN — Medication 10 MG: at 08:36

## 2023-11-28 RX ADMIN — MIDAZOLAM 2 MG: 1 INJECTION INTRAMUSCULAR; INTRAVENOUS at 07:04

## 2023-11-28 RX ADMIN — PROPOFOL 30 MG: 10 INJECTION, EMULSION INTRAVENOUS at 09:13

## 2023-11-28 RX ADMIN — URINARY PAIN RELIEF 95 MG: 95 TABLET ORAL at 06:18

## 2023-11-28 RX ADMIN — DEXAMETHASONE SODIUM PHOSPHATE 5 MG: 10 INJECTION INTRAMUSCULAR; INTRAVENOUS at 07:36

## 2023-11-28 RX ADMIN — ROCURONIUM BROMIDE 10 MG: 10 INJECTION, SOLUTION INTRAVENOUS at 07:45

## 2023-11-28 RX ADMIN — KETAMINE HYDROCHLORIDE 30 MG: 50 INJECTION, SOLUTION INTRAMUSCULAR; INTRAVENOUS at 07:35

## 2023-11-28 RX ADMIN — SODIUM CHLORIDE, SODIUM LACTATE, POTASSIUM CHLORIDE, AND CALCIUM CHLORIDE: 600; 310; 30; 20 INJECTION, SOLUTION INTRAVENOUS at 08:58

## 2023-11-28 RX ADMIN — Medication 2000 MG: at 07:14

## 2023-11-28 RX ADMIN — Medication 10 MG: at 07:14

## 2023-11-28 RX ADMIN — PROPOFOL 180 MG: 10 INJECTION, EMULSION INTRAVENOUS at 07:09

## 2023-11-28 RX ADMIN — Medication 3 MG: at 09:38

## 2023-11-28 RX ADMIN — SODIUM CHLORIDE, SODIUM LACTATE, POTASSIUM CHLORIDE, AND CALCIUM CHLORIDE: 600; 310; 30; 20 INJECTION, SOLUTION INTRAVENOUS at 06:18

## 2023-11-28 RX ADMIN — LIDOCAINE HYDROCHLORIDE 100 MG: 20 INJECTION, SOLUTION EPIDURAL; INFILTRATION; INTRACAUDAL; PERINEURAL at 07:09

## 2023-11-28 RX ADMIN — OXYCODONE 5 MG: 5 TABLET ORAL at 10:47

## 2023-11-28 RX ADMIN — ONDANSETRON 4 MG: 2 INJECTION INTRAMUSCULAR; INTRAVENOUS at 07:36

## 2023-11-28 RX ADMIN — ACETAMINOPHEN 1000 MG: 500 TABLET, FILM COATED ORAL at 06:18

## 2023-11-28 RX ADMIN — HYDROMORPHONE HYDROCHLORIDE 0.5 MG: 1 INJECTION, SOLUTION INTRAMUSCULAR; INTRAVENOUS; SUBCUTANEOUS at 10:18

## 2023-11-28 ASSESSMENT — PAIN SCALES - GENERAL
PAINLEVEL_OUTOF10: 8
PAINLEVEL_OUTOF10: 5
PAINLEVEL_OUTOF10: 10
PAINLEVEL_OUTOF10: 5
PAINLEVEL_OUTOF10: 6

## 2023-11-28 ASSESSMENT — PAIN - FUNCTIONAL ASSESSMENT: PAIN_FUNCTIONAL_ASSESSMENT: 0-10

## 2023-11-28 NOTE — OP NOTE
PROCEDURES PERFORMED:  1. Robotic-assisted laparoscopic sacral colpopexy with Restorelle Y mesh. 2. Enterocele Repair  3. Mid urethral sling ([place] [employer] Advantage Sling). 4. Cystourethroscopy. PREOPERATIVE DIAGNOSES:  1. Anterior Wall Prolapse  2. Apical Prolapse. 3. Stress urinary incontinence. POSTOPERATIVE DIAGNOSES:  1. Anterior Wall Prolapse  2. Apical Prolapse. 3. Stress urinary incontinence. EBL: 200cc    IVF: 1200cc    Uo: [id number]    Complications: None    Beau Quinonez IV, MD      INTRAOPERATIVE FINDINGS: Laparoscopic inspection revealed small adhesions from the omentum to the anterior abdominal wall. The adhesion was taken down with a combination of blunt and sharp dissection without complication. Intraoperative cystourethroscopy revealed normal bladder and urethral mucosa without evidence of laceration, foreign body, neoplasm, or stone. Both ureters were effluxing urine at the conclusion of the case. Rectal exam was normal at the conclusion of the case. INDICATIONS FOR PROCEDURE: This is a 52-year-old female with a history of worsening symptomatic pelvic organ prolapse and stress urinary incontinence who desired definitive surgical treatment after being extensively counseled on the risks, benefits, indications and alternatives of the procedure. Risks reviewed include bleeding, infection, damage to the bladder, ureters, urethra, bowel, blood vessels, nerves, postoperative urinary retention, postoperative incontinence, pelvic pain, dyspareunia, recurrence, mesh erosion, anesthetic complications and death. The patient expressed understanding and informed consent was obtained. DESCRIPTION OF PROCEDURE: On the day of surgery, the patient was identified in the preop waiting area. Consents were again reviewed. The patient was then taken to the operating room where she was placed in dorsal lithotomy position using the Yellofin stirrups in neurologically safe position.  Antithrombotic

## 2023-11-28 NOTE — PERIOP NOTE
At  11:00 am, the patient was placed in lithotomy. The avalos bag was removed from the indwelling catheter and 300 cc of sterile water was instilled into the bladder. The avalos catheter was then removed. The patient was asked to void. She was unable to void. The patient was discharged with a avalos catheter.

## 2023-11-28 NOTE — PROGRESS NOTES
Patient/caregivers speak [place]  as their preferred language for their healthcare communication. For safe communication, use the [facility]  carts or call:    Senior  Navigator Rj Garcia at [phone] or   [facility] phone services for Cara at [phone]    General phone: [phone] ( [phone])  Email: [email or web address]    Always document the use of interpreting services ('s ID number) in your clinical notes. Our interpreters are available for team members working with limited  [place] proficient (LEP) patients remotely, via phone or video or in person (if needed for special cases). When using family members to interpret, for the safety of the patient and protection of the communication of both our patient and [facility] staff the VRI or telephonic  should remain on the line to monitor that all communication is accurate and complete. The  should be instructed to notify [facility] staff immediately if there are any inaccuracies.          Thank you,        Rj CELAYA  Senior /Navigator

## 2023-11-28 NOTE — PERIOP NOTE
Pt to pacu in nad. Now awake and alert. Using  to communicate. Session [id number].  Lela Jones # [id number]

## 2023-11-30 ENCOUNTER — NURSE ONLY (OUTPATIENT)
Dept: UROGYNECOLOGY | Age: 53
End: 2023-11-30

## 2023-11-30 NOTE — PROGRESS NOTES
The patient was palced in lithotomy. The avalos bag was removed and 250cc of sterile water was instilled into the bladder. The avalos catheter was then removed. The patient was asked to void. She was able to void 300.

## 2023-12-28 ENCOUNTER — OFFICE VISIT (OUTPATIENT)
Dept: UROGYNECOLOGY | Age: 53
End: 2023-12-28

## 2023-12-28 DIAGNOSIS — N81.10 VAGINAL PROLAPSE: Primary | ICD-10-CM

## 2023-12-28 DIAGNOSIS — N95.2 VAGINAL ATROPHY: ICD-10-CM

## 2023-12-28 PROCEDURE — 99024 POSTOP FOLLOW-UP VISIT: CPT | Performed by: OBSTETRICS & GYNECOLOGY

## 2023-12-28 RX ORDER — CONJUGATED ESTROGENS 0.62 MG/G
0.5 CREAM VAGINAL
Qty: 30 G | Refills: 5 | Status: SHIPPED | OUTPATIENT
Start: 2023-12-29

## 2023-12-28 NOTE — PROGRESS NOTES
[facility] UROGYNECOLOGY  [address]  Dept: [phone]        PCP:  Pat Fatima    12/28/2023    Chief Complaint   Patient presents with    Post-Op Check     4 week     HPI:  Linda Carbajal is here for her postop check. She had a Robotic-assisted laparoscopic sacral colpopexy with Restorelle Y mesh, Enterocele Repair, Mid urethral sling ([employer] Advantage Sling) & Cystourethroscopy 11/28/23. She is doing very well today. She denies fevers, chills nausea, vomiting, chest pain, shortness of breath. She is ambulating, tolerating a regular diet, and pain is well controlled. She does not have any vaginal bleeding and is currently back to doing her normal activities of daily living. She does not have any difficulty with urination or bowel movements. She does not have any signs or symptoms of POP or incontinence recurrence. No results found for this visit on 12/28/23. There were no vitals taken for this visit. Exam  Incisions:  Clean, Dry, and Intact. Healing well.    Vulva:    Normal. No lesions  Bartholin's Gland:  Bilateral , Normal, nontender  Skenes Gland:  Bilateral, Normal, nontender   Clitoris:  Normal.   Introitus:    Normal.   Urethral Meatus:  Normal appearing, normal size, no lesions, no prolapse  Urethra:  No masses, no tenderness  Vagina:  No atrophy, no discharge, no lesions, no evidence of mesh erosion or complication     Adnexa:   No masses palpated, no tenderness  Bladder:  No tenderness, no masses palpated  Perineum:  Normal, no lesions    Rectal   Anorectal Exam: No hemorrhoids and no masses or lesions of the perineum           8/30/2023     4:00 PM   Pelvic Organ Prolapse Quantification   Anterior Wall (Aa) 1   Anterior Wall (Ba) 1   Cervix or Cuff (C) -6   Genital Haitus (gh) 5   Perineal Body (pb) 4   Total Vaginal Length (tvl) 9   Posterior Wall (Ap) -1   Posterior Wall (Bp) -1   Posterior Fornix (D) X

## 2024-04-24 ENCOUNTER — OFFICE VISIT (OUTPATIENT)
Dept: UROGYNECOLOGY | Age: 54
End: 2024-04-24
Payer: COMMERCIAL

## 2024-04-24 DIAGNOSIS — N81.10 VAGINAL PROLAPSE: Primary | ICD-10-CM

## 2024-04-24 PROCEDURE — 99213 OFFICE O/P EST LOW 20 MIN: CPT | Performed by: OBSTETRICS & GYNECOLOGY

## 2024-04-24 NOTE — PROGRESS NOTES
[facility] UROGYNECOLOGY  [address]  [address]  [facility] [address]  Dept: [phone]        PCP:  Shabana Castellanos PA    4/24/2024    Chief Complaint   Patient presents with    Follow-up     3 months post op follow up       HPI:  Delaney Bauer is here for her postop check. She had a Robotic-assisted laparoscopic sacral colpopexy with Restorelle Y mesh, Enterocele Repair, Mid urethral sling ([employer] Advantage Sling) & Cystourethroscopy 11/28/23   . She is doing very well today. She denies fevers, chills nausea, vomiting, chest pain, shortness of breath. She is ambulating, tolerating a regular diet, and pain is well controlled. She does not have any vaginal bleeding and is currently back to doing her normal activities of daily living.  She does not have any difficulty with urination or bowel movements.     She does not have any signs or symptoms of POP or incontinence recurrence.    No results found for this visit on 04/24/24.    There were no vitals taken for this visit.    Exam: This includes at least 4 minutes of clinical staff time associated with chaperoning a pelvic exam.  Incisions:  Clean, Dry, and Intact. Healing well.   Vulva:    Normal. No lesions  Bartholin's Gland:  Bilateral , Normal, nontender  Skenes Gland:  Bilateral, Normal, nontender   Clitoris:  Normal.   Introitus:    Normal.   Urethral Meatus:  Normal appearing, normal size, no lesions, no prolapse  Urethra:  No masses, no tenderness  Vagina:  No atrophy, no discharge, no lesions, no signs of mesh erosion or complications  Perineum:  Normal, no lesions    Rectal   Anorectal Exam: No hemorrhoids and no masses or lesions of the perineum           4/24/2024     9:00 AM 8/30/2023     4:00 PM   Pelvic Organ Prolapse Quantification   Anterior Wall (Aa) -2 1   Anterior Wall (Ba) -2 1   Cervix or Cuff (C) -7 -6   Genital Haitus (gh) 3 5   Perineal Body (pb) 3 4   Total Vaginal Length (tvl) 8 9   Posterior Wall (Ap) -2 -1

## 2024-04-24 NOTE — ASSESSMENT & PLAN NOTE
You no longer have lifting, or bathing restrictions. You should be back to your normal activities of daily living.     I am releasing you back to your primary care provider. Should your symptoms recur, or new symptoms arise, please do not hesitate to call our office for an appointment.     She has injured her left arm recently and should focus on healing ad getting better.

## (undated) DEVICE — PAD,NON-ADHERENT,3X8,STERILE,LF,1/PK: Brand: MEDLINE

## (undated) DEVICE — TIP COVER ACCESSORY

## (undated) DEVICE — SEAL

## (undated) DEVICE — LUKI TUBE SPECIMEN COLLECTION DEVICE,20 ML: Brand: ARGYLE

## (undated) DEVICE — DRAPE,T,LAPARO,TRANS,STERILE: Brand: MEDLINE

## (undated) DEVICE — TRAY PREP DRY W/ PREM GLV 2 APPL 6 SPNG 2 UNDPD 1 OVERWRAP

## (undated) DEVICE — COVER,MAYO STAND,STERILE: Brand: MEDLINE

## (undated) DEVICE — SUT PLN 2-0 27IN CT1 YEL --

## (undated) DEVICE — SOLUTION IV 1000ML 0.9% SOD CHL

## (undated) DEVICE — SUT VCRL + 3-0 27IN X1 VIO --

## (undated) DEVICE — SOLUTION IRRIG 1000ML 0.9% SOD CHL USP POUR PLAS BTL

## (undated) DEVICE — CYSTO/BLADDER IRRIGATION SET, REGULATING CLAMP

## (undated) DEVICE — Device

## (undated) DEVICE — BLADELESS OBTURATOR: Brand: WECK VISTA

## (undated) DEVICE — INTENDED FOR TISSUE SEPARATION, AND OTHER PROCEDURES THAT REQUIRE A SHARP SURGICAL BLADE TO PUNCTURE OR CUT.: Brand: BARD-PARKER ® STAINLESS STEEL BLADES

## (undated) DEVICE — SUTURE PROL SZ 0 L30IN NONABSORBABLE BLU L26MM CT-2 1/2 CIR 8412H

## (undated) DEVICE — CANISTER, RIGID, 2000CC: Brand: MEDLINE INDUSTRIES, INC.

## (undated) DEVICE — CONTAINER SPEC HISTOLOGY 900ML POLYPR

## (undated) DEVICE — REM POLYHESIVE ADULT PATIENT RETURN ELECTRODE: Brand: VALLEYLAB

## (undated) DEVICE — BLADE ELECTRODE: Brand: VALLEYLAB

## (undated) DEVICE — SOLUTION IRRIGATION H2O 0797305] ICU MEDICAL INC]

## (undated) DEVICE — SUTURE MCRYL SZ 2-0 L27IN ABSRB VLT CT-2 L26MM 1/2 CIR Y333H

## (undated) DEVICE — SURGICAL PROCEDURE PACK BASIC ST FRANCIS

## (undated) DEVICE — PAD PT POS 36 IN SURGYPAD DISP

## (undated) DEVICE — ARM DRAPE

## (undated) DEVICE — GARMENT,MEDLINE,DVT,INT,CALF,MED, GEN2: Brand: MEDLINE

## (undated) DEVICE — AIRSEAL 8 MM ACCESS PORT AND LOW PROFILE OBTURATOR WITH BLADELESS OPTICAL TIP, 120 MM LENGTH: Brand: AIRSEAL

## (undated) DEVICE — ROBOTIC DRAPE WITH LEGGINGS: Brand: CONVERTORS

## (undated) DEVICE — SOLUTION IV 1000ML LAC RINGERS PH 6.5 INJ USP VIAFLX PLAS

## (undated) DEVICE — 3M™ TEGADERM™ TRANSPARENT FILM DRESSING FRAME STYLE, 1624W, 2-3/8 IN X 2-3/4 IN (6 CM X 7 CM), 100/CT 4CT/CASE: Brand: 3M™ TEGADERM™

## (undated) DEVICE — (D)PREP SKN CHLRAPRP APPL 26ML -- CONVERT TO ITEM 371833

## (undated) DEVICE — 1LYRTR 16FR10ML 100%SILI SNAP: Brand: MEDLINE INDUSTRIES, INC.

## (undated) DEVICE — COLUMN DRAPE

## (undated) DEVICE — SUTURE ABSORBABLE BRAIDED 0 CT-1 8X27 IN UD VICRYL JJ41G

## (undated) DEVICE — SUTURE COAT VCRL SZ 4-0 L18IN ABSRB UD L19MM PS-2 1/2 CIR J496G

## (undated) DEVICE — SUTURE PLN GUT SZ 3-0 L27IN ABSRB YELLOWISH TAN L40MM CT 852H

## (undated) DEVICE — SPONGE LAP 18X18IN STRL -- 5/PK

## (undated) DEVICE — GLOVE SURG SZ 6 L12IN FNGR THK79MIL GRN LTX FREE

## (undated) DEVICE — DERMABOND SKIN ADH 0.7ML -- DERMABOND ADVANCED 12/BX

## (undated) DEVICE — LITHOTOMY: Brand: MEDLINE INDUSTRIES, INC.

## (undated) DEVICE — PAD MATERNITY 11IN W/TAILS -- STRL

## (undated) DEVICE — SUTURE PDS II SZ 0 L27IN ABSRB VLT L36MM CT-1 1/2 CIR Z340H

## (undated) DEVICE — 1840 FOAM BLOCK NEEDLE COUNTER: Brand: DEVON

## (undated) DEVICE — 2000CC GUARDIAN II: Brand: GUARDIAN

## (undated) DEVICE — SUTURE VCRL SZ 2-0 L36IN ABSRB UD L36MM CT-1 1/2 CIR J945H

## (undated) DEVICE — GAUZE,SPONGE,4"X4",16PLY,STRL,LF,10/TRAY: Brand: MEDLINE

## (undated) DEVICE — SUTURE PDS II SZ 0 L27IN ABSRB VLT L26MM CT-2 1/2 CIR Z334H

## (undated) DEVICE — TOTAL 1-LAYER TRAY, LATEX FOLEY, 16FR 10: Brand: MEDLINE

## (undated) DEVICE — GAUZE,SPONGE,2"X2",8PLY,STERILE,LF,2'S: Brand: MEDLINE

## (undated) DEVICE — ROBOTIC HYSTERECTOMY: Brand: MEDLINE INDUSTRIES, INC.

## (undated) DEVICE — CARDINAL HEALTH FLEXIBLE LIGHT HANDLE COVER: Brand: CARDINAL HEALTH

## (undated) DEVICE — SUTURE MCRYL SZ 4-0 L27IN ABSRB UD L19MM PS-2 1/2 CIR PRIM Y426H

## (undated) DEVICE — TUBING, SUCTION, 1/4" X 10', STRAIGHT: Brand: MEDLINE

## (undated) DEVICE — GLOVE SURG SZ 6 THK91MIL LTX FREE SYN POLYISOPRENE ANTI

## (undated) DEVICE — LIQUIBAND RAPID ADHESIVE 36/CS 0.8ML: Brand: MEDLINE

## (undated) DEVICE — YANKAUER,BULB TIP,W/O VENT,RIGID,STERILE: Brand: MEDLINE